# Patient Record
Sex: FEMALE | Race: WHITE | NOT HISPANIC OR LATINO | Employment: OTHER | URBAN - METROPOLITAN AREA
[De-identification: names, ages, dates, MRNs, and addresses within clinical notes are randomized per-mention and may not be internally consistent; named-entity substitution may affect disease eponyms.]

---

## 2017-07-17 ENCOUNTER — CONVERSION ENCOUNTER (OUTPATIENT)
Dept: OTHER | Facility: HOSPITAL | Age: 72
End: 2017-07-17

## 2018-02-14 ENCOUNTER — OFFICE VISIT CONVERTED (OUTPATIENT)
Dept: FAMILY MEDICINE CLINIC | Age: 73
End: 2018-02-14
Attending: FAMILY MEDICINE

## 2018-03-28 ENCOUNTER — OFFICE VISIT CONVERTED (OUTPATIENT)
Dept: FAMILY MEDICINE CLINIC | Age: 73
End: 2018-03-28
Attending: FAMILY MEDICINE

## 2018-07-10 ENCOUNTER — OFFICE VISIT CONVERTED (OUTPATIENT)
Dept: FAMILY MEDICINE CLINIC | Age: 73
End: 2018-07-10
Attending: FAMILY MEDICINE

## 2018-07-18 ENCOUNTER — OFFICE VISIT CONVERTED (OUTPATIENT)
Dept: FAMILY MEDICINE CLINIC | Age: 73
End: 2018-07-18
Attending: FAMILY MEDICINE

## 2018-08-15 ENCOUNTER — CONVERSION ENCOUNTER (OUTPATIENT)
Dept: OTHER | Facility: HOSPITAL | Age: 73
End: 2018-08-15

## 2018-08-17 ENCOUNTER — CONVERSION ENCOUNTER (OUTPATIENT)
Dept: OTHER | Facility: HOSPITAL | Age: 73
End: 2018-08-17

## 2018-08-17 ENCOUNTER — OFFICE VISIT CONVERTED (OUTPATIENT)
Dept: CARDIOLOGY | Facility: CLINIC | Age: 73
End: 2018-08-17
Attending: INTERNAL MEDICINE

## 2018-08-29 ENCOUNTER — CONVERSION ENCOUNTER (OUTPATIENT)
Dept: CARDIOLOGY | Facility: CLINIC | Age: 73
End: 2018-08-29
Attending: INTERNAL MEDICINE

## 2018-10-18 ENCOUNTER — OFFICE VISIT CONVERTED (OUTPATIENT)
Dept: FAMILY MEDICINE CLINIC | Age: 73
End: 2018-10-18
Attending: FAMILY MEDICINE

## 2019-01-07 ENCOUNTER — OFFICE VISIT CONVERTED (OUTPATIENT)
Dept: FAMILY MEDICINE CLINIC | Age: 74
End: 2019-01-07
Attending: FAMILY MEDICINE

## 2019-01-22 ENCOUNTER — HOSPITAL ENCOUNTER (OUTPATIENT)
Dept: OTHER | Facility: HOSPITAL | Age: 74
Discharge: HOME | End: 2019-01-22
Attending: FAMILY MEDICINE

## 2019-01-22 ENCOUNTER — OFFICE VISIT CONVERTED (OUTPATIENT)
Dept: FAMILY MEDICINE CLINIC | Age: 74
End: 2019-01-22
Attending: FAMILY MEDICINE

## 2019-01-22 LAB
ALBUMIN SERPL-MCNC: 4.2 G/DL (ref 3.5–5)
ALBUMIN/GLOB SERPL: 1.4 {RATIO} (ref 1.4–2.6)
ALP SERPL-CCNC: 40 U/L (ref 43–160)
ALT SERPL-CCNC: 47 U/L (ref 10–40)
ANION GAP SERPL CALC-SCNC: 20 MMOL/L (ref 8–19)
AST SERPL-CCNC: 49 U/L (ref 15–50)
BILIRUB SERPL-MCNC: 0.37 MG/DL (ref 0.2–1.3)
BUN SERPL-MCNC: 19 MG/DL (ref 5–25)
BUN/CREAT SERPL: 18 {RATIO} (ref 6–20)
CALCIUM SERPL-MCNC: 9.8 MG/DL (ref 8.7–10.4)
CHLORIDE SERPL-SCNC: 100 MMOL/L (ref 99–111)
CHOLEST SERPL-MCNC: 189 MG/DL (ref 107–200)
CHOLEST/HDLC SERPL: 4 {RATIO} (ref 3–6)
CONV CO2: 24 MMOL/L (ref 22–32)
CONV TOTAL PROTEIN: 7.2 G/DL (ref 6.3–8.2)
CREAT UR-MCNC: 1.03 MG/DL (ref 0.5–0.9)
EST. AVERAGE GLUCOSE BLD GHB EST-MCNC: 212 MG/DL
GFR SERPLBLD BASED ON 1.73 SQ M-ARVRAT: 54 ML/MIN/{1.73_M2}
GLOBULIN UR ELPH-MCNC: 3 G/DL (ref 2–3.5)
GLUCOSE SERPL-MCNC: 215 MG/DL (ref 65–99)
HBA1C MFR BLD: 9 % (ref 3.5–5.7)
HDLC SERPL-MCNC: 47 MG/DL (ref 40–60)
LDLC SERPL CALC-MCNC: 110 MG/DL (ref 70–100)
OSMOLALITY SERPL CALC.SUM OF ELEC: 297 MOSM/KG (ref 273–304)
POTASSIUM SERPL-SCNC: 4.5 MMOL/L (ref 3.5–5.3)
SODIUM SERPL-SCNC: 139 MMOL/L (ref 135–147)
TRIGL SERPL-MCNC: 160 MG/DL (ref 40–150)
VLDLC SERPL-MCNC: 32 MG/DL (ref 5–37)

## 2019-02-04 ENCOUNTER — OFFICE VISIT CONVERTED (OUTPATIENT)
Dept: FAMILY MEDICINE CLINIC | Age: 74
End: 2019-02-04
Attending: NURSE PRACTITIONER

## 2019-03-05 ENCOUNTER — OFFICE VISIT CONVERTED (OUTPATIENT)
Dept: FAMILY MEDICINE CLINIC | Age: 74
End: 2019-03-05
Attending: FAMILY MEDICINE

## 2019-03-13 ENCOUNTER — OFFICE VISIT CONVERTED (OUTPATIENT)
Dept: FAMILY MEDICINE CLINIC | Age: 74
End: 2019-03-13
Attending: FAMILY MEDICINE

## 2019-03-13 ENCOUNTER — HOSPITAL ENCOUNTER (OUTPATIENT)
Dept: OTHER | Facility: HOSPITAL | Age: 74
Discharge: HOME | End: 2019-03-13
Attending: FAMILY MEDICINE

## 2019-03-13 ENCOUNTER — OFFICE VISIT CONVERTED (OUTPATIENT)
Dept: OTOLARYNGOLOGY | Facility: CLINIC | Age: 74
End: 2019-03-13
Attending: OTOLARYNGOLOGY

## 2019-03-13 ENCOUNTER — CONVERSION ENCOUNTER (OUTPATIENT)
Dept: OTOLARYNGOLOGY | Facility: CLINIC | Age: 74
End: 2019-03-13

## 2019-04-04 ENCOUNTER — HOSPITAL ENCOUNTER (OUTPATIENT)
Dept: PHYSICAL THERAPY | Facility: CLINIC | Age: 74
Setting detail: RECURRING SERIES
Discharge: HOME | End: 2019-05-02
Attending: OTOLARYNGOLOGY

## 2019-04-22 ENCOUNTER — OFFICE VISIT CONVERTED (OUTPATIENT)
Dept: FAMILY MEDICINE CLINIC | Age: 74
End: 2019-04-22
Attending: FAMILY MEDICINE

## 2019-07-31 ENCOUNTER — HOSPITAL ENCOUNTER (OUTPATIENT)
Dept: OTHER | Facility: HOSPITAL | Age: 74
Discharge: HOME | End: 2019-07-31
Attending: FAMILY MEDICINE

## 2019-07-31 ENCOUNTER — OFFICE VISIT CONVERTED (OUTPATIENT)
Dept: FAMILY MEDICINE CLINIC | Age: 74
End: 2019-07-31
Attending: FAMILY MEDICINE

## 2019-07-31 LAB
ALBUMIN SERPL-MCNC: 4.2 G/DL (ref 3.5–5)
ALBUMIN/GLOB SERPL: 1.8 {RATIO} (ref 1.4–2.6)
ALP SERPL-CCNC: 48 U/L (ref 43–160)
ALT SERPL-CCNC: 38 U/L (ref 10–40)
ANION GAP SERPL CALC-SCNC: 15 MMOL/L (ref 8–19)
AST SERPL-CCNC: 26 U/L (ref 15–50)
BILIRUB SERPL-MCNC: 0.5 MG/DL (ref 0.2–1.3)
BUN SERPL-MCNC: 13 MG/DL (ref 5–25)
BUN/CREAT SERPL: 15 {RATIO} (ref 6–20)
CALCIUM SERPL-MCNC: 10.2 MG/DL (ref 8.7–10.4)
CHLORIDE SERPL-SCNC: 102 MMOL/L (ref 99–111)
CHOLEST SERPL-MCNC: 258 MG/DL (ref 107–200)
CHOLEST/HDLC SERPL: 5.4 {RATIO} (ref 3–6)
CONV CO2: 26 MMOL/L (ref 22–32)
CONV CREATININE URINE, RANDOM: 255.7 MG/DL (ref 10–300)
CONV MICROALBUM.,U,RANDOM: 49.4 MG/L (ref 0–20)
CONV TOTAL PROTEIN: 6.6 G/DL (ref 6.3–8.2)
CREAT UR-MCNC: 0.86 MG/DL (ref 0.5–0.9)
EST. AVERAGE GLUCOSE BLD GHB EST-MCNC: 255 MG/DL
GFR SERPLBLD BASED ON 1.73 SQ M-ARVRAT: >60 ML/MIN/{1.73_M2}
GLOBULIN UR ELPH-MCNC: 2.4 G/DL (ref 2–3.5)
GLUCOSE SERPL-MCNC: 192 MG/DL (ref 65–99)
HBA1C MFR BLD: 10.5 % (ref 3.5–5.7)
HDLC SERPL-MCNC: 48 MG/DL (ref 40–60)
LDLC SERPL CALC-MCNC: 186 MG/DL (ref 70–100)
MICROALBUMIN/CREAT UR: 19.3 MG/G{CRE} (ref 0–35)
OSMOLALITY SERPL CALC.SUM OF ELEC: 293 MOSM/KG (ref 273–304)
POTASSIUM SERPL-SCNC: 4.4 MMOL/L (ref 3.5–5.3)
SODIUM SERPL-SCNC: 139 MMOL/L (ref 135–147)
TRIGL SERPL-MCNC: 122 MG/DL (ref 40–150)
VIT B12 SERPL-MCNC: 372 PG/ML (ref 211–911)
VLDLC SERPL-MCNC: 24 MG/DL (ref 5–37)

## 2019-08-22 ENCOUNTER — HOSPITAL ENCOUNTER (OUTPATIENT)
Dept: OTHER | Facility: HOSPITAL | Age: 74
Discharge: HOME | End: 2019-08-22
Attending: FAMILY MEDICINE

## 2019-10-23 ENCOUNTER — OFFICE VISIT CONVERTED (OUTPATIENT)
Dept: FAMILY MEDICINE CLINIC | Age: 74
End: 2019-10-23
Attending: FAMILY MEDICINE

## 2019-10-31 ENCOUNTER — OFFICE VISIT CONVERTED (OUTPATIENT)
Dept: FAMILY MEDICINE CLINIC | Age: 74
End: 2019-10-31
Attending: FAMILY MEDICINE

## 2019-11-18 ENCOUNTER — OFFICE VISIT CONVERTED (OUTPATIENT)
Dept: DIABETES SERVICES | Facility: HOSPITAL | Age: 74
End: 2019-11-18
Attending: NURSE PRACTITIONER

## 2019-12-16 ENCOUNTER — OFFICE VISIT CONVERTED (OUTPATIENT)
Dept: DIABETES SERVICES | Facility: HOSPITAL | Age: 74
End: 2019-12-16
Attending: NURSE PRACTITIONER

## 2020-01-23 ENCOUNTER — OFFICE VISIT CONVERTED (OUTPATIENT)
Dept: FAMILY MEDICINE CLINIC | Age: 75
End: 2020-01-23
Attending: FAMILY MEDICINE

## 2020-01-23 ENCOUNTER — HOSPITAL ENCOUNTER (OUTPATIENT)
Dept: OTHER | Facility: HOSPITAL | Age: 75
Discharge: HOME | End: 2020-01-23
Attending: FAMILY MEDICINE

## 2020-01-23 LAB
ALBUMIN SERPL-MCNC: 4 G/DL (ref 3.5–5)
ALBUMIN/GLOB SERPL: 1.5 {RATIO} (ref 1.4–2.6)
ALP SERPL-CCNC: 71 U/L (ref 43–160)
ALT SERPL-CCNC: 43 U/L (ref 10–40)
ANION GAP SERPL CALC-SCNC: 17 MMOL/L (ref 8–19)
AST SERPL-CCNC: 41 U/L (ref 15–50)
BILIRUB SERPL-MCNC: 0.37 MG/DL (ref 0.2–1.3)
BUN SERPL-MCNC: 23 MG/DL (ref 5–25)
BUN/CREAT SERPL: 25 {RATIO} (ref 6–20)
CALCIUM SERPL-MCNC: 9.9 MG/DL (ref 8.7–10.4)
CHLORIDE SERPL-SCNC: 101 MMOL/L (ref 99–111)
CHOLEST SERPL-MCNC: 162 MG/DL (ref 107–200)
CHOLEST/HDLC SERPL: 3.8 {RATIO} (ref 3–6)
CONV CO2: 24 MMOL/L (ref 22–32)
CONV TOTAL PROTEIN: 6.7 G/DL (ref 6.3–8.2)
CREAT UR-MCNC: 0.92 MG/DL (ref 0.5–0.9)
EST. AVERAGE GLUCOSE BLD GHB EST-MCNC: 263 MG/DL
GFR SERPLBLD BASED ON 1.73 SQ M-ARVRAT: >60 ML/MIN/{1.73_M2}
GLOBULIN UR ELPH-MCNC: 2.7 G/DL (ref 2–3.5)
GLUCOSE SERPL-MCNC: 237 MG/DL (ref 65–99)
HBA1C MFR BLD: 10.8 % (ref 3.5–5.7)
HDLC SERPL-MCNC: 43 MG/DL (ref 40–60)
LDLC SERPL CALC-MCNC: 98 MG/DL (ref 70–100)
OSMOLALITY SERPL CALC.SUM OF ELEC: 295 MOSM/KG (ref 273–304)
POTASSIUM SERPL-SCNC: 4.6 MMOL/L (ref 3.5–5.3)
SODIUM SERPL-SCNC: 137 MMOL/L (ref 135–147)
TRIGL SERPL-MCNC: 104 MG/DL (ref 40–150)
VLDLC SERPL-MCNC: 21 MG/DL (ref 5–37)

## 2020-02-13 ENCOUNTER — OFFICE VISIT CONVERTED (OUTPATIENT)
Dept: FAMILY MEDICINE CLINIC | Age: 75
End: 2020-02-13
Attending: NURSE PRACTITIONER

## 2020-02-14 ENCOUNTER — OFFICE VISIT CONVERTED (OUTPATIENT)
Dept: FAMILY MEDICINE CLINIC | Age: 75
End: 2020-02-14
Attending: FAMILY MEDICINE

## 2020-02-14 ENCOUNTER — HOSPITAL ENCOUNTER (OUTPATIENT)
Dept: OTHER | Facility: HOSPITAL | Age: 75
Discharge: HOME | End: 2020-02-14
Attending: NURSE PRACTITIONER

## 2020-02-19 ENCOUNTER — OFFICE VISIT CONVERTED (OUTPATIENT)
Dept: FAMILY MEDICINE CLINIC | Age: 75
End: 2020-02-19
Attending: FAMILY MEDICINE

## 2020-04-17 ENCOUNTER — OFFICE VISIT CONVERTED (OUTPATIENT)
Dept: FAMILY MEDICINE CLINIC | Age: 75
End: 2020-04-17
Attending: FAMILY MEDICINE

## 2020-04-22 ENCOUNTER — OFFICE VISIT CONVERTED (OUTPATIENT)
Dept: FAMILY MEDICINE CLINIC | Age: 75
End: 2020-04-22
Attending: FAMILY MEDICINE

## 2020-06-04 ENCOUNTER — OFFICE VISIT CONVERTED (OUTPATIENT)
Dept: DIABETES SERVICES | Facility: HOSPITAL | Age: 75
End: 2020-06-04
Attending: NURSE PRACTITIONER

## 2020-06-04 ENCOUNTER — HOSPITAL ENCOUNTER (OUTPATIENT)
Dept: DIABETES SERVICES | Facility: HOSPITAL | Age: 75
Discharge: HOME | End: 2020-06-04
Attending: NURSE PRACTITIONER

## 2020-06-16 ENCOUNTER — OFFICE VISIT CONVERTED (OUTPATIENT)
Dept: FAMILY MEDICINE CLINIC | Age: 75
End: 2020-06-16
Attending: FAMILY MEDICINE

## 2020-08-05 ENCOUNTER — OFFICE VISIT CONVERTED (OUTPATIENT)
Dept: FAMILY MEDICINE CLINIC | Age: 75
End: 2020-08-05
Attending: FAMILY MEDICINE

## 2020-08-05 ENCOUNTER — HOSPITAL ENCOUNTER (OUTPATIENT)
Dept: OTHER | Facility: HOSPITAL | Age: 75
Discharge: HOME | End: 2020-08-05
Attending: FAMILY MEDICINE

## 2020-08-05 LAB
BASOPHILS # BLD MANUAL: 0.02 10*3/UL (ref 0–0.2)
BASOPHILS NFR BLD MANUAL: 0.2 % (ref 0–3)
DEPRECATED RDW RBC AUTO: 44.5 FL
EOSINOPHIL # BLD MANUAL: 0.17 10*3/UL (ref 0–0.7)
EOSINOPHIL NFR BLD MANUAL: 2.1 % (ref 0–7)
ERYTHROCYTE [DISTWIDTH] IN BLOOD BY AUTOMATED COUNT: 13.9 % (ref 11.5–14.5)
GRANS (ABSOLUTE): 4.99 10*3/UL (ref 2–8)
GRANS: 61.5 % (ref 30–85)
HBA1C MFR BLD: 14.7 G/DL (ref 12–16)
HCT VFR BLD AUTO: 44.2 % (ref 37–47)
IMM GRANULOCYTES # BLD: 0.02 10*3/UL (ref 0–0.54)
IMM GRANULOCYTES NFR BLD: 0.2 % (ref 0–0.43)
LYMPHOCYTES # BLD MANUAL: 2.36 10*3/UL (ref 1–5)
LYMPHOCYTES NFR BLD MANUAL: 6.9 % (ref 3–10)
MCH RBC QN AUTO: 28.8 PG (ref 27–31)
MCHC RBC AUTO-ENTMCNC: 33.3 G/DL (ref 33–37)
MCV RBC AUTO: 86.5 FL (ref 81–99)
MONOCYTES # BLD AUTO: 0.56 10*3/UL (ref 0.2–1.2)
PLATELET # BLD AUTO: 219 10*3/UL (ref 130–400)
PMV BLD AUTO: 10 FL (ref 7.4–10.4)
RBC # BLD AUTO: 5.11 10*6/UL (ref 4.2–5.4)
VARIANT LYMPHS NFR BLD MANUAL: 29.1 % (ref 20–45)
WBC # BLD AUTO: 8.12 10*3/UL (ref 4.8–10.8)

## 2020-08-06 LAB
ALBUMIN SERPL-MCNC: 4.3 G/DL (ref 3.5–5)
ALBUMIN/GLOB SERPL: 1.3 {RATIO} (ref 1.4–2.6)
ALP SERPL-CCNC: 39 U/L (ref 43–160)
ALT SERPL-CCNC: 46 U/L (ref 10–40)
ANION GAP SERPL CALC-SCNC: 19 MMOL/L (ref 8–19)
AST SERPL-CCNC: 43 U/L (ref 15–50)
BILIRUB SERPL-MCNC: 0.59 MG/DL (ref 0.2–1.3)
BUN SERPL-MCNC: 15 MG/DL (ref 5–25)
BUN/CREAT SERPL: 14 {RATIO} (ref 6–20)
CALCIUM SERPL-MCNC: 10.7 MG/DL (ref 8.7–10.4)
CHLORIDE SERPL-SCNC: 100 MMOL/L (ref 99–111)
CHOLEST SERPL-MCNC: 324 MG/DL (ref 107–200)
CHOLEST/HDLC SERPL: 5.8 {RATIO} (ref 3–6)
CONV CO2: 24 MMOL/L (ref 22–32)
CONV CREATININE URINE, RANDOM: 205.3 MG/DL (ref 10–300)
CONV MICROALBUM.,U,RANDOM: 68.2 MG/L (ref 0–20)
CONV TOTAL PROTEIN: 7.5 G/DL (ref 6.3–8.2)
CREAT UR-MCNC: 1.08 MG/DL (ref 0.5–0.9)
EST. AVERAGE GLUCOSE BLD GHB EST-MCNC: 237 MG/DL
FOLATE SERPL-MCNC: 10.3 NG/ML (ref 4.8–20)
GFR SERPLBLD BASED ON 1.73 SQ M-ARVRAT: 50 ML/MIN/{1.73_M2}
GLOBULIN UR ELPH-MCNC: 3.2 G/DL (ref 2–3.5)
GLUCOSE SERPL-MCNC: 189 MG/DL (ref 65–99)
HBA1C MFR BLD: 9.9 % (ref 3.5–5.7)
HDLC SERPL-MCNC: 56 MG/DL (ref 40–60)
LDLC SERPL CALC-MCNC: 248 MG/DL (ref 70–100)
MICROALBUMIN/CREAT UR: 33.2 MG/G{CRE} (ref 0–35)
OSMOLALITY SERPL CALC.SUM OF ELEC: 292 MOSM/KG (ref 273–304)
POTASSIUM SERPL-SCNC: 4.7 MMOL/L (ref 3.5–5.3)
SODIUM SERPL-SCNC: 138 MMOL/L (ref 135–147)
TRIGL SERPL-MCNC: 98 MG/DL (ref 40–150)
TSH SERPL-ACNC: 1.28 M[IU]/L (ref 0.27–4.2)
VIT B12 SERPL-MCNC: 303 PG/ML (ref 211–911)
VLDLC SERPL-MCNC: 20 MG/DL (ref 5–37)

## 2020-08-31 ENCOUNTER — HOSPITAL ENCOUNTER (OUTPATIENT)
Dept: OTHER | Facility: HOSPITAL | Age: 75
Discharge: HOME | End: 2020-08-31
Attending: FAMILY MEDICINE

## 2020-09-02 ENCOUNTER — OFFICE VISIT CONVERTED (OUTPATIENT)
Dept: FAMILY MEDICINE CLINIC | Age: 75
End: 2020-09-02
Attending: NURSE PRACTITIONER

## 2020-09-04 ENCOUNTER — HOSPITAL ENCOUNTER (OUTPATIENT)
Dept: DIABETES SERVICES | Facility: HOSPITAL | Age: 75
Discharge: HOME | End: 2020-09-04
Attending: NURSE PRACTITIONER

## 2020-09-04 ENCOUNTER — OFFICE VISIT CONVERTED (OUTPATIENT)
Dept: DIABETES SERVICES | Facility: HOSPITAL | Age: 75
End: 2020-09-04
Attending: NURSE PRACTITIONER

## 2020-09-15 ENCOUNTER — OFFICE VISIT CONVERTED (OUTPATIENT)
Dept: OTOLARYNGOLOGY | Facility: CLINIC | Age: 75
End: 2020-09-15
Attending: OTOLARYNGOLOGY

## 2020-09-30 ENCOUNTER — OFFICE VISIT CONVERTED (OUTPATIENT)
Dept: NEUROLOGY | Facility: CLINIC | Age: 75
End: 2020-09-30
Attending: FAMILY MEDICINE

## 2020-11-03 ENCOUNTER — OFFICE VISIT CONVERTED (OUTPATIENT)
Dept: PSYCHIATRY | Facility: CLINIC | Age: 75
End: 2020-11-03
Attending: STUDENT IN AN ORGANIZED HEALTH CARE EDUCATION/TRAINING PROGRAM

## 2021-01-05 ENCOUNTER — TELEMEDICINE CONVERTED (OUTPATIENT)
Dept: PSYCHIATRY | Facility: CLINIC | Age: 76
End: 2021-01-05
Attending: STUDENT IN AN ORGANIZED HEALTH CARE EDUCATION/TRAINING PROGRAM

## 2021-01-19 ENCOUNTER — TELEMEDICINE CONVERTED (OUTPATIENT)
Dept: PSYCHIATRY | Facility: CLINIC | Age: 76
End: 2021-01-19
Attending: STUDENT IN AN ORGANIZED HEALTH CARE EDUCATION/TRAINING PROGRAM

## 2021-03-02 ENCOUNTER — TELEMEDICINE CONVERTED (OUTPATIENT)
Dept: PSYCHIATRY | Facility: CLINIC | Age: 76
End: 2021-03-02
Attending: STUDENT IN AN ORGANIZED HEALTH CARE EDUCATION/TRAINING PROGRAM

## 2021-05-10 NOTE — H&P
"   History and Physical      Patient Name: Denisse Malave   Patient ID: [id number]   Sex: Female   YOB: 1945    Primary Care Provider: Geeta Patterson MD   Referring Provider: Geeta Patterson MD    Visit Date: November 3, 2020    Provider: Edy Li MD   Location: [facility]   Location Address: [address] [place] [address]  [id number]   Location Phone: [phone]          Chief Complaint     PT presents withy anxiety, depression, and dementia      \"I just need help.\"       History Of Present Illness  Denisse Malave is a 75 year old /White female who presents to the office today referred by Geeta Patterson MD.   Chart: Seen 9/30 by Bailey. Falls (2-3x/mo) and gait abnormalities.  passed in 2013. Shuffling gait; uses rollator. MRI: frontotemporal atrophy and ventriculomegaly. Diffuse white matter changes. Incontinent. Likely vascular dementia. On venlafaxine XR 75 mg TID. Outside records (from Sharon Stinson and Geeta Patterson, scanned in 11/3, [facility]) show patient was \"very depressed and cries a lot\" at her assisted living facility. PHQ-9 is 18. CKD stage 3, H/o stroke, HLD, HTN, T2DM, TBI?.   Patient presents with her son Francisco Javier. Patient reports that she was moved to assisted living about 1 month ago from the house that she had lived in for decades. Initially she hated it and cried every day, not wanting to be there. Patient would call her family and be verbally harsh with them every day telling them that they did not care about her because they would not make arrangements for her to leave. At that time, the appointment with our office was made. However, nowadays, she is getting used to it, and \"it is getting better.\" Patient reports \"they take good care of me there.\" Francisco Javier agrees, stating patient is much better. Patient recently started quetiapine 25 mg at 3 PM daily, which the patient believes helps with anxiety and is helping her sleep.   No " "acute concerns. No SI HI AVH. PHQ 9 score today is 5, none to mild. Krishna 7 score is 20 today, severe, however the patient denies anxiety.   Patient does report having visual hallucinations of her  fishing on a pond with her neighbor about 6 months ago when she would look out of the window of her house. It is unclear how that hallucination occurred. The patient denies any hallucinations since that time, although she does note that sometimes she dreams about her , who was \"a good man.\"   Patient is medication compliant on quetiapine 25 mg p.o. q. 3:00 daily, and venlafaxine 75 mg 3 times daily. Patient and son report that they tried to cut down on the venlafaxine to only 2 doses a day many months ago, and the patient became very irritable, prompting them to increase the dose back to 3 times a day. The patient made this temporary change without notifying her primary care physician.   Past Psychiatric History:  Began Psychiatric Treatment: Denies psychiatric care in the past. Patient was treated by her primary care physician, who diagnosed her with depression. Denies admissions. Denies self-harm or suicide attempts. Patient does note that she might have been tried on other medications besides venlafaxine, but this was over 3 decades ago and she cannot remember the names. Denies any experience with psychotherapy.   Substance Abuse History:  Types: Denies all, including illicit   Social History:  Marital Status:    Employed: No     Kids: 2 sons, 1 .   House: Now lives in an assisted living facility.    Hx: Denies   Family History:  Suicide Attempts: Denies   Suicide Completions: Denies   Substance Use: Her brother once was a heavy drinker   Psychiatric Conditions: Denies    depression, psychosis, anxiety: Deferred   Developmental History:  Born: [place]   Siblings: Deferred   Childhood: no abuse and \"Good and poor\" childhood   High School: Patient has her GED   College: " Denies   Access to Weapons: Denies   Thought Content: no suicidal ideation, no homicidal ideation, no auditory hallucinations, and no visual hallucinations       Past Medical History  Anxiety; Diabetes; Dizziness; Hearing loss; HTN (hypertension); Hyperlipemia; Vertigo         Past Surgical History  EAR Surgery; Gallbladder; Hysterectomy; Shoulder surgery         Medication List  acetaminophen 500 mg oral tablet; Basaglar KwikPen U-100 Insulin 100 unit/mL (3 mL) subcutaneous insulin pen; ciprofloxacin HCl 0.3 % ophthalmic (eye) drops; losartan 100 mg oral tablet; metformin 1,000 mg oral tablet; metoprolol succinate 50 mg oral tablet extended release 24 hr; venlafaxine 75 mg oral tablet         Allergy List  NO KNOWN DRUG ALLERGIES         Family Medical History  Family history of heart disease; Family history of diabetes mellitus; Family history of hypertension         Social History  Alcohol; Tobacco (Never)         Review of Systems  · Constitutional  o Denies  o : fatigue, night sweats  · Eyes  o Admits  o : double vision, blurred vision  · HENT  o Admits  o : vertigo  o Denies  o : recent head injury  · Cardiovascular  o Denies  o : chest pain, irregular heart beats  · Respiratory  o Denies  o : shortness of breath, productive cough  · Gastrointestinal  o Denies  o : nausea, vomiting  · Genitourinary  o Denies  o : dysuria, urinary retention  · Integument  o Denies  o : hair growth change, new skin lesions  · Neurologic  o Denies  o : altered mental status, seizures  · Musculoskeletal  o Admits  o : limitation of motion  o Denies  o : joint swelling  · Endocrine  o Denies  o : cold intolerance, heat intolerance  · Psychiatric  o Admits  o : anxiety, depression  o Denies  o : post traumatic stress disorder, obsessive compulsive disorder, psychosis, ed  · Allergic-Immunologic  o Denies  o : frequent illnesses      Vitals  Date Time BP Position Site L\R Cuff Size HR RR TEMP (F) WT  HT  BMI kg/m2 BSA m2 O2 Sat FR  "L/min FiO2 HC       11/03/2020 09:21 /65 Sitting    68 - R 20 96.1 169lbs 6oz 5'  3.5\" 29.53 1.86 97 %  21%          Physical Examination  · Mental Status Exam  o Appearance  o : good eye contact, normal street clothes, groomed, wearing a mask under her mouth. Uses a Rollator.  o Behavior  o : pleasant and cooperative  o Motor  o : no abnormal  o Speech  o : normal rhythm, rate, volume, tone, not hyperverbal, not pressured, normal prosidy  o Mood  o : \"Getting better\"  o Affect  o : euthymic  o Thought Content  o : negative suicidal ideations, negative homicidal ideations, negative obsessions  o Perceptions  o : negative auditory hallucinations, negative visual hallucinations  o Thought Process  o : linear, some circumstantiality  o Insight/Judgement  o : fair/fair  o Cognition  o : grossly intact  o Attention  o : intact, possibly hard of hearing          Assessment  · Screening for depression     V79.0/Z13.31  · Anxiety     300.02/F41.1  · Depression, unspecified depression type     311/F32.9  · Dementia     294.8/F03.90       Presentation most consistent with adjustment disorder related to moving from a home to an assisted living facility.  Patient is now adjusting better.  Also vascular dementia, with likely sundowning. Duval score is 4 today.  Patient has a history of depression controlled on venlafaxine 75 mg p.o. 3 times daily.  She is now sleeping better on quetiapine in the evenings. Start melatonin over the counter to target sundowning (impaired circadian regulation).    Continue medications.  I counseled the patient and her son that there is a theoretical risk of a higher mortality rate in patients placed on antipsychotics with dementia.  I emphasized that the patient is on a very low-dose of an antipsychotic. They voiced understanding, and agreed to proceed.    Ordered an EKG, CBC, TSH, BMP, B12 and folate today.    Patient is comfortable with her primary care physician seeing these notes.    See " back in 2 months.       Plan  · Orders  o ACO-18: Positive screen for clinical depression using a standardized tool and a follow-up plan documented () - V79.0/Z13.31 - 11/03/2020  o B12 Folate levels (B12FO) - 294.8/F03.90, V79.0/Z13.31 - 11/03/2020  o BMP [facility] (35618) - 294.8/F03.90, V79.0/Z13.31 - 11/03/2020  o CBC with Auto Diff [facility] (76155) - 294.8/F03.90, V79.0/Z13.31 - 11/03/2020  o TSH [facility] (57722) - 294.8/F03.90, V79.0/Z13.31 - 11/03/2020  o Free (FT4) thyroxine assay (27794) - 294.8/F03.90, V79.0/Z13.31 - 11/03/2020  o ACO-39: Current medications updated and reviewed (, 1159F) - - 11/03/2020  o EKG with at least 12 leads, INTERPRETATION AND REPORT ONLY [facility] (07689) - 294.8/F03.90, V79.0/Z13.31 - 11/03/2020  · Medications  o Medications have been Reconciled  o Transition of Care or Provider Policy  · Instructions  o Risk Assessment: Risk of self-harm acutely is low. Risk factors include significant psychosocial stressor involved in moving to an assisted living facility, possible depressive symptoms, possible anxiety symptoms. Protective factors include no access to weapons, no present SI, no history of self-harm or suicide attempts, no family history, no AODA, healthcare seeking, future oriented, willingness to engage in care. Risk of self-harm chronically is also low, but could be further elevated in the event of treatment noncompliance and/or AODA.  o Safety: No acute safety concerns.  o Medications: Continue venlafaxine 75 mg p.o. 3 times daily. START low dose melatonin in the evenings at 5 pm to target sundowning. Continue quetiapine 25 mg p.o. every 3 p.m. Risks, benefits discussed with patient including sedation, dizziness, GI upset, nausea and vomiting. Also discussed theoretical risk of increased mortality on an antipsychotic. After discussion of these risks and benefits, the patient and her son voiced understanding and agreed to proceed.  o Therapy: Possibly in the future.  o Labs/Studies:  CBC, BMP, TSH, free T4, B12, and folate. Also an EKG to assess QTC.  o Follow Up: 2 months.  · Disposition  o Follow Up in 2 months.  · Correspondence  o Behavioral Health Letter to Referring MD (Geeta Patterson MD) - 11/03/2020            Electronically Signed by: Edy Li MD -Author on November 3, 2020 01:14:56 PM

## 2021-05-10 NOTE — H&P
History and Physical      Patient Name: Denisse Malave   Patient ID: [id number]   Sex: Female   YOB: 1945    Primary Care Provider: Geeta Patterson MD   Referring Provider: Geeta Patterson MD    Visit Date: 2020    Provider: Clyde Avalos MD   Location: [facility]   Location Address: [address] [place] [address]  [id number]   Location Phone: [phone]          Chief Complaint     New pt here for recurrent falls/ possible normal pressure hydrocephalus.       History Of Present Illness  Denisse Malave is a 75 year old /White female who presents today to [facility] today referred from Geeta Patterson MD.      75-year-old woman evaluated for gait abnormalities and falls.  She states that she has been falling for the last 10 years.  She le783 times prior to 2013 when her   at that time.  Since that time she follows usually anywhere from 2-3 times a month.  She gets to stumble on a daily basis.  She lives by herself.  Her son is with her today.  She states that she can fall backwards as well.  She does not  her feet.  She shuffles her feet and walks with a wide-based gait.  She does not use her four-point cane all the time.  She has a Rollator walker at home that she does not use.  There is x1 according to the son the patient acts like a 4-year-old and does not cooperate.  She has had an MRI of the brain showing frontotemporal atrophy and diffuse atrophy and the ventriculomegaly is not out of proportion to the atrophy.  She has scattered white matter changes that is diffuse including the brainstem.  She states that she is wearing pull-ups recently for incontinence.  She drives on occasion.  Her son states that he does not think that she is demented.       Past Medical History  Anxiety; Diabetes; Dizziness; Hearing loss; HTN (hypertension); Hyperlipemia; Vertigo         Past Surgical History  EAR Surgery;  Gallbladder; Hysterectomy; Shoulder surgery         Medication List  acetaminophen 500 mg oral tablet; Basaglar KwikPen U-100 Insulin 100 unit/mL (3 mL) subcutaneous insulin pen; ciprofloxacin HCl 0.3 % ophthalmic (eye) drops; losartan 100 mg oral tablet; metformin 1,000 mg oral tablet; metoprolol succinate 50 mg oral tablet extended release 24 hr; venlafaxine 75 mg oral tablet         Allergy List  NO KNOWN DRUG ALLERGIES         Family Medical History  Family history of heart disease; Family history of diabetes mellitus; Family history of hypertension         Social History  Alcohol; Tobacco (Never)         Review of Systems  · Constitutional  o Denies  o : chills, excessive sweating, fatigue, fever, sycope/passing out, weight gain, weight loss  · Eyes  o Denies  o : changes in vision, blurry vision, double vision  · HENT  o Denies  o : loss of hearing, ringing in the ears, ear aches, sore throat, nasal congestion, sinus pain, nose bleeds, seasonal allergies  · Cardiovascular  o Denies  o : blood clots, swollen legs, anemia, easy burising or bleeding, transfusions  · Respiratory  o Denies  o : shortness of breath, dry cough, productive cough, pneumonia, COPD  · Gastrointestinal  o Denies  o : difficulty swallowing, reflux  · Genitourinary  o Denies  o : incontinence  · Neurologic  o Denies  o : headache, seizure, stroke, tremor, loss of balance, falls, dizziness/vertigo, difficulty with sleep, numbness/tingling/paresthesia , difficulty with coordination, difficulty with dexterity, weakness  · Musculoskeletal  o Denies  o : neck stiffness/pain, swollen lymph nodes, muscle aches, joint pain, weakness, spasms, sciatica, pain radiating in arm, pain radiating in leg, low back pain  · Endocrine  o Denies  o : diabetes, thyroid disorder  · Psychiatric  o Denies  o : anxiety, depression      Vitals  Date Time BP Position Site L\R Cuff Size HR RR TEMP (F) WT  HT  BMI kg/m2 BSA m2 O2 Sat FR L/min FiO2 HC       09/30/2020  "02:02 PM        96.4           09/30/2020 02:26 /71 Sitting    64 - R   172lbs 0oz 5'  3\" 30.47 1.86             Physical Examination     She is alert, fluent, phasic, follows commands well intermittently.  Her Mini-Mental status score is 22 out of 30.  She missed the date, 3 out of 3 recent objects, 3 out of 5 spelling world backwards.  Clock drawing is impaired on 3 attempts.  The first attempt she did not space her numbers the right way and could not draw the hands of a clock correctly.  The second attempt she did not follow my instructions and full 12:00 instead of 11:10 and she missed drawing 1:00 on the clock.  On the third attempt she put the center of the clock at 10:00 short hand to 11 and long hand to 2 for instructions of 11:10.  She got frustrated and told me she did not know what she was doing. She is able to tell me the president [place].  Optic disks are normal bilaterally, visual fields are full, EOMs full all directions gaze, facial strength is full, soft palate elevation and tongue are normal.  There is no tremor of the upper or lower extremities.  There is no weakness individual muscle testing proximally and distally.  Reflexes are absent in the biceps, triceps and patellar's and ankles.  Sensation is decreased to pinprick up to the knee.  Vibration is impaired higher than the knee.  Proprioception is absent in the toes.  Cerebellar testing is intact.  Station and gait she has a wide base gait that is slow and shuffling she picks up her four-point cane and does not use it.  She has postural instability and that she tends to lean backwards or sideways when she is sitting down table.  Her heart is regular rhythm normal in rate.           Assessment  · Mild cognitive impairment with memory loss       Mild cognitive impairment, so stated     331.83/G31.84  I discussed with the son that she has cognitive dysfunction most likely secondary to small vessel disease and may be in early stages of " dementia. She needs to be in a supervised setting.  · Diabetic polyneuropathy       Type 2 diabetes mellitus with diabetic polyneuropathy     250.60/E11.42  · Gait disturbance     781.2/R26.9  Her gait disturbance is a combination of diabetic polyneuropathy as well as vascular parkinsonism. Discussed with her and her son that I would recommend for her to use a Rollator walker all the time. I have prescribed her wheelchairs since she was requesting for it. I discussed with him that she is at increased risk for falling and injuring herself. This includes fractures as well as intracranial injury. I would recommend for her to be placed in an assisted living or with supervision to help her with her medications. She is not taking her medications correctly according to the son.  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 She has been having gait abnormalities for years.    60 minutes was spent for this high complexity encounter more than half the time was spent face-to-face with the patient for examination, counseling, planning and recommendations      Plan  · Medications  o Medications have been Reconciled  o Transition of Care or Provider Policy  · Instructions  o Encouraged to follow-up with Primary Care Provider for preventative care.            Electronically Signed by: Clyde Avalos MD -Author on September 30, 2020 03:31:38 PM

## 2021-05-13 NOTE — PROGRESS NOTES
Progress Note      Patient Name: Denisse Malave   Patient ID: [id number]   Sex: Female   YOB: 1945    Primary Care Provider: Geeta Patterson MD   Referring Provider: Geeta Patterson MD    Visit Date: September 15, 2020    Provider: Josh Christianson MD   Location: [facility]   Location Address: [address] [place] [address], [address]  [id number]   Location Phone: [phone]          Chief Complaint     1.  Hearing loss    2.  Otosclerosis    3.  Right ear drainage       History Of Present Illness  Denisse Malave is a 75 year old /White female who presents to the office today for a follow-up visit.      She presents the clinic today for evaluation of right ear drainage.  She has a long history of ear issues and has not had multiple ear surgeries.  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  She has significant hearing loss in the right ear, and uses a hearing aid for this.  Recently, she has had drainage on the right side as well as discomfort.  She has not been on any antibiotics recently, and presents for further evaluation.  She denies any dizzy spells, but has a longstanding history of issues with dizziness.  She was previously treated for benign paroxysmal positional vertigo in the early parts of 2019.  Denies any vertigo issues with this recent infection.       Past Medical History  Anxiety; Diabetes; Dizziness; Hearing loss; HTN (hypertension); Hyperlipemia; Vertigo         Past Surgical History  EAR Surgery; Gallbladder; Hysterectomy; Shoulder surgery         Medication List  amlodipine 5 mg oral tablet; Basaglar KwikPen U-100 Insulin 100 unit/mL (3 mL) subcutaneous insulin pen; Crestor 10 mg oral tablet; Effexor XR 75 mg oral capsule,extended release 24hr; losartan 100 mg oral tablet; metformin 1,000 mg oral tablet; metoprolol succinate 50 mg oral tablet extended release 24 hr; tramadol 50 mg oral tablet         Allergy List  NO KNOWN DRUG ALLERGIES  "        Family Medical History  Family history of heart disease; Family history of diabetes mellitus; Family history of hypertension         Social History  Tobacco (Never)         Review of Systems  · Constitutional  o Denies  o : fever, night sweats, weight loss  · Eyes  o Denies  o : discharge from eye, impaired vision  · HENT  o Admits  o : *See HPI  · Cardiovascular  o Denies  o : chest pain, irregular heart beats  · Respiratory  o Denies  o : shortness of breath, wheezing, coughing up blood  · Gastrointestinal  o Denies  o : heartburn, reflux, vomiting blood  · Genitourinary  o Denies  o : frequency  · Integument  o Denies  o : rash, skin dryness  · Neurologic  o Denies  o : seizures, loss of balance, loss of consciousness, dizziness  · Endocrine  o Denies  o : cold intolerance, heat intolerance  · Heme-Lymph  o Denies  o : easy bleeding, anemia      Vitals  Date Time BP Position Site L\R Cuff Size HR RR TEMP (F) WT  HT  BMI kg/m2 BSA m2 O2 Sat HC       09/15/2020 02:57 PM        97.2 164lbs 2oz 5'  3\" 29.07 1.82           Physical Examination  · Constitutional  o Appearance  o : well developed, well-nourished, alert and in no acute distress, voice clear and strong  · Head and Face  o Head  o :   § Inspection  § : no deformities or lesions  o Face  o :   § Inspection  § : No facial lesions; House-Brackmann I/VI bilaterally  § Palpation  § : No TMJ crepitus nor  muscle tenderness bilaterally  · Eyes  o Vision  o :   § Visual Fields  § : Extraocular movements are intact. No spontaneous or gaze-induced nystagmus.  o Conjunctivae  o : clear  o Sclerae  o : clear  o Pupils and Irises  o : pupils equal, round, and reactive to light.   · Ears, Nose, Mouth and Throat  o Ears  o :   § External Ears  § : appearance within normal limits, no lesions present  § Otoscopic Examination  § : Cerumen impacted, removed with suction and microscope, tympanic membrane appearance dull bilaterally with blunted anatomical " features, purulent drainage on the right side, suctioned and Ciprodex drops were infused.  § Hearing  § : intact to conversational voice both ears  o Nose  o :   § External Nose  § : appearance normal  § Intranasal Exam  § : mucosa within normal limits, vestibules normal, no intranasal lesions present, septum midline, sinuses non tender to percussion  o Oral Cavity  o :   § Oral Mucosa  § : oral mucosa normal without pallor or cyanosis  § Lips  § : lip appearance normal  § Teeth  § : normal dentition for age  § Gums  § : gums pink, non-swollen, no bleeding present  § Tongue  § : tongue appearance normal; normal mobility  § Palate  § : hard palate normal, soft palate appearance normal with symmetric mobility  o Throat  o :   § Oropharynx  § : no inflammation or lesions present, tonsils within normal limits  § Hypopharynx  § : appearance within normal limits, superior epiglottis within normal limits  § Larynx  § : appearance within normal limits, vocal cords within normal limits, no lesions present  · Neck  o Inspection/Palpation  o : normal appearance, no masses or tenderness, trachea midline; thyroid size normal, nontender, no nodules or masses present on palpation  · Respiratory  o Respiratory Effort  o : breathing unlabored  o Inspection of Chest  o : normal appearance, no retractions  · Cardiovascular  o Heart  o : regular rate and rhythm  · Lymphatic  o Neck  o : no lymphadenopathy present  o Supraclavicular Nodes  o : no lymphadenopathy present  o Preauricular Nodes  o : no lymphadenopathy present  · Skin and Subcutaneous Tissue  o General Inspection  o : Regarding face and neck - there are no rashes present, no lesions present, and no areas of discoloration  · Neurologic  o Cranial Nerves  o : cranial nerves II-XII are grossly intact bilaterally  o Gait and Station  o : normal gait, able to stand without diffculty  · Psychiatric  o Judgement and Insight  o : judgment and insight intact  o Mood and Affect  o :  mood normal, affect appropriate          Assessment  · Cerumen impaction     380.4/H61.20  · Hearing loss     389.9/H91.90  · Otitis externa     380.10/H60.90  · Myringitis     NOCODE/H73.20    Problems Reconciled  Plan  · Orders  o Cerumen Removal by Provider, Unilateral [facility] ([id number]) - 389.9/H91.90, 380.4/H61.20 - 09/15/2020  · Medications  o ciprofloxacin HCl 0.3 % ophthalmic (eye) drops   SIG: 3 drops to right ear TID for 7 days   DISP: (1) 2.5 ml drop btl with 0 refills  Prescribed on 09/15/2020     o Medications have been Reconciled  o Transition of Care or Provider Policy  · Instructions  o She presents the clinic today for evaluation of right ear drainage. She has a long history of ear issues and has not had multiple ear surgeries. 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 She has significant hearing loss in the right ear, and uses a hearing aid for this. Recently, she has had drainage on the right side as well as discomfort. She has not been on any antibiotics recently, and presents for further evaluation. She denies any dizzy spells, but has a longstanding history of issues with dizziness. She was previously treated for benign paroxysmal positional vertigo in the early parts of 2019. Denies any vertigo issues with this recent infection. On examination today she did have a cerumen impaction and purulent drainage on the right side. I was able to remove this and infused Ciprodex drops. I prescribed Floxin drops for her to use at home, and will have her use a hair dryer to keep the ear dry. I will see her back on an as-needed basis.  o Electronically Identified Patient Education Materials Provided Electronically  · Correspondence  o ENT Letter to Referring MD (Geeta Patterson MD) - 09/15/2020            Electronically Signed by: Josh Christianson MD -Author on September 15, 2020 03:21:30 PM

## 2021-05-14 VITALS
HEART RATE: 64 BPM | WEIGHT: 172 LBS | TEMPERATURE: 96.4 F | BODY MASS INDEX: 30.48 KG/M2 | SYSTOLIC BLOOD PRESSURE: 179 MMHG | HEIGHT: 63 IN | DIASTOLIC BLOOD PRESSURE: 71 MMHG

## 2021-05-14 VITALS — BODY MASS INDEX: 29.08 KG/M2 | WEIGHT: 164.12 LBS | TEMPERATURE: 97.2 F | HEIGHT: 63 IN

## 2021-05-14 VITALS
TEMPERATURE: 96.1 F | OXYGEN SATURATION: 97 % | HEART RATE: 68 BPM | DIASTOLIC BLOOD PRESSURE: 65 MMHG | RESPIRATION RATE: 20 BRPM | WEIGHT: 169.37 LBS | BODY MASS INDEX: 30.01 KG/M2 | SYSTOLIC BLOOD PRESSURE: 159 MMHG | HEIGHT: 63 IN

## 2021-05-14 NOTE — PROGRESS NOTES
"   Progress Note      Patient Name: Denisse Malave   Patient ID: [id number]   Sex: Female   YOB: 1945    Primary Care Provider: Geeta Patterson MD   Referring Provider: Geeta Patterson MD    Visit Date: January 19, 2021    Provider: Edy Li MD   Location: [facility]   Location Address: [address] [place] [address]  [address] [place] [address]  [id number]   Location Phone: [phone]          Chief Complaint     \"My granddaughter came over last night.\"       History Of Present Illness  Denisse Malave is a 75 year old /White female who presents to the office today referred by Geeta Patterson MD.   Chart: Seen 9/30 by [place]. Falls (2-3x/mo) and gait abnormalities.  passed in 2013. Shuffling gait; uses rollator. MRI: frontotemporal atrophy and ventriculomegaly. Diffuse white matter changes. Incontinent. Likely vascular dementia. On venlafaxine XR 75 mg TID. Outside records (from [facility] and Geeta Patterson, scanned in 11/3, [facility]) show patient was \"very depressed and cries a lot\" at her assisted living facility. PHQ-9 is 18. CKD stage 3, H/o stroke, HLD, HTN, T2DM, TBI?.   Virtual visit via Zoom audio and video due to the COVID-19 pandemic. Patient is accepting of and agreeable to appointment. The appointment consisted of the patient and I only. Interview: Patient presents with her son Francisco Javier. Francisco Javier did most of the talking. Patient hallucinated last night that her granddaughter came over and brought 6 people. Their presence was irritating to the patient who wanted to pack up to leave and move out. States she is sleeping okay and eating well. Her mood is also improved. Francisco Javier essentially confirms. Patient has been doing well until last night when she hallucinated that her granddaughter visited with about 6 other people. No SI HI.   Patient fell out of bed at 7 in the morning 5 days ago and hit her head on a heater causing a minor brain bleed. Patient has mobility " issues and uses a walker.   ...   ...       Past Medical History  Disease Name Date Onset Notes   Anxiety --  --    Cancer of leg --  --    Dementia --  --    Depression, unspecified depression type --  --    Diabetes --  --    Dizziness --  --    Hearing loss --  --    HTN (hypertension) --  --    Hyperlipemia --  --    Vertigo --  --          Past Surgical History  Procedure Name Date Notes   EAR Surgery --  --    Gallbladder --  --    Hysterectomy --  --    Open bariatric bypass surgery --  --    Shoulder surgery --  --    Thyroid surgery --  --          Medication List  Name Date Started Instructions   acetaminophen 500 mg oral tablet  --    amlodipine 5 mg oral tablet  take 1 tablet (5 mg) by oral route once daily   Basaglar KwikPen U-100 Insulin 100 unit/mL (3 mL) subcutaneous insulin pen  --    Crestor 10 mg oral tablet  take 1 tablet (10 mg) by oral route once daily at bedtime   ergocalciferol (vitamin D2) 1,250 mcg (50,000 unit) oral capsule  take 1 capsule by oral route every 7 days   losartan 100 mg oral tablet  take 1 tablet (100 mg) by oral route once daily   melatonin 3 mg oral capsule  take 1 capsule by oral route once a day (at bedtime)   metformin 1,000 mg oral tablet  take 1 tablet by oral route daily   metoprolol succinate 50 mg oral tablet extended release 24 hr  take 1 tablet (50 mg) by oral route once daily   Risperdal 0.5 mg oral tablet 01/05/2021 take 1 tablet by oral route BID (at 3 pm and 8 pm)   venlafaxine 75 mg oral tablet  take 1 tablet (75 mg) by oral route 3 times per day with food         Allergy List  Allergen Name Date Reaction Notes   NO KNOWN DRUG ALLERGIES --  --  --        Allergies Reconciled  Family Medical History  Disease Name Relative/Age Notes   Family history of heart disease  --    Family history of diabetes mellitus  --    Family history of hypertension Mother/  Sister/   --          Social History  Finding Status Start/Stop Quantity Notes   Alcohol --  --/-- --   "01/19/2021 - 01/05/2021 - 11/03/2020 -    Tobacco Never --/-- --  09/30/2020 - 09/30/2020 -          Review of Systems  · Constitutional  o Admits  o : fatigue  o Denies  o : night sweats  · Eyes  o Admits  o : double vision, blurred vision  · HENT  o Admits  o : vertigo, recent head injury  · Breasts  o Denies  o : abnormal changes in breast size, additional breast symptoms except as noted in the HPI  · Cardiovascular  o Denies  o : chest pain, irregular heart beats  · Respiratory  o Denies  o : shortness of breath, productive cough  · Gastrointestinal  o Denies  o : nausea, vomiting  · Genitourinary  o Denies  o : dysuria, urinary retention  · Integument  o Denies  o : hair growth change, new skin lesions  · Neurologic  o Admits  o : altered mental status  o Denies  o : seizures  · Musculoskeletal  o Admits  o : joint swelling, limitation of motion  · Endocrine  o Admits  o : cold intolerance  o Denies  o : heat intolerance  · Heme-Lymph  o Denies  o : petechiae, lymph node enlargement or tenderness  · Allergic-Immunologic  o Denies  o : frequent illnesses      Physical Examination  · Mental Status Exam  o Appearance  o : good eye contact, normal street clothes, groomed, sitting at a table  o Behavior  o : pleasant and cooperative  o Motor  o : Psychomotor retardation  o Speech  o : normal rhythm, rate, volume, tone, not hyperverbal, not pressured, normal prosidy  o Mood  o : \"Okay\"  o Affect  o : Slight constriction, but able to smile  o Thought Content  o : negative suicidal ideations, negative homicidal ideations, negative obsessions  o Perceptions  o : negative auditory hallucinations, negative visual hallucinations  o Thought Process  o : Impaired due to dementia  o Insight/Judgement  o : Impaired due to dementia  o Cognition  o : Impaired due to dementia  o Attention  o : intact, possibly hard of hearing          Assessment  · Anxiety     300.02/F41.1  · Depression, unspecified depression " type     311/F32.9  · Dementia     294.8/F03.90       Presentation most consistent with adjustment disorder related to moving from a home to an assisted living facility.  Patient is now adjusting better.  Also vascular dementia, with likely sundowning.  Patient has a history of depression controlled on venlafaxine 75 mg p.o. 3 times daily.      Improved, but some hallucinations and agitation last night.  Increase risperidone in the evenings from 0.5 mg to 1.0 mg.  Status post quetiapine.  Sleeping and eating well.    Continue medications.  I counseled the patient and her son that there is a theoretical risk of a higher mortality rate in patients placed on antipsychotics with dementia.  I emphasized that the patient is on a very low-dose of an antipsychotic. They voiced understanding, and agreed to proceed.    Follow up on EKG, CBC, TSH, BMP, B12 and folate.  They did not receive these order requisitions, so I will resend to Francisco Javier's address.    Request for records from [facility] for labs.    4 wks.       Plan  · Orders  o Prolactin Level (Serum) (13134) - 294.8/F03.90, 311/F32.9, 300.02/F41.1 - 01/19/2021  o ACO-39: Current medications updated and reviewed (, 1159F) - - 01/19/2021  · Medications  o risperidone 0.5 mg oral tablet   SIG: take 0.5 tablet by oral route at 8 pm (in addition to her other 0.5 mg dose)   DISP: (90) Tablet with 1 refills  Prescribed on 01/19/2021     o Medications have been Reconciled  o Transition of Care or Provider Policy  · Instructions  o Risk Assessment: Risk of self-harm acutely is low. Risk factors include significant psychosocial stressor involved in moving to an assisted living facility, possible depressive symptoms, possible anxiety symptoms. Protective factors include no access to weapons, no present SI, no history of self-harm or suicide attempts, no family history, no AODA, healthcare seeking, future oriented, willingness to engage in care. Risk of self-harm chronically is also  low, but could be further elevated in the event of treatment noncompliance and/or AODA.  o Safety: No acute safety concerns.  o Medications: Continue venlafaxine 75 mg p.o. 3 times daily, low dose melatonin (3 mg) in the evenings at 5 pm to target sundowning. INCREASE risperidone to 0.5 mg PO QDAY and 1.0 mg QHS (3 pm and 8 pm) to target insomnia, agitation, AVH, delusions. Risks, benefits discussed with patient including sedation, dizziness, GI upset, nausea and vomiting, increased prolactin levels, tardive dyskinesia. Also discussed theoretical risk of increased mortality on an antipsychotic. After discussion of these risks and benefits, the patient and her son voiced understanding and agreed to proceed. *** s/p quetiapine.  o Therapy: Possibly in the future.  o Labs/Studies: CBC, BMP, TSH, free T4, B12, and folate. Also an EKG to assess QTC.  o Follow Up: 4 wks.  · Disposition  o Follow Up in 1 month.            Electronically Signed by: Edy Li MD -Author on January 19, 2021 09:52:40 AM

## 2021-05-14 NOTE — PROGRESS NOTES
"   Progress Note      Patient Name: Denisse Malave   Patient ID: [id number]   Sex: Female   YOB: 1945    Primary Care Provider: Geeta Patterson MD   Referring Provider: Geeta Patterson MD    Visit Date: January 5, 2021    Provider: Edy Li MD   Location: [facility]   Location Address: [address] [place] [address]  [id number]   Location Phone: [phone]          Chief Complaint     \"Her behavior has improved a little.\"       History Of Present Illness  Denisse Malave is a 75 year old /White female who presents to the office today referred by Geeta Patterson MD.   Chart: Seen 9/30 by [place]. Falls (2-3x/mo) and gait abnormalities.  passed in 2013. Shuffling gait; uses rollator. MRI: frontotemporal atrophy and ventriculomegaly. Diffuse white matter changes. Incontinent. Likely vascular dementia. On venlafaxine XR 75 mg TID. Outside records (from [facility] and Geeta Patterson, scanned in 11/3, [facility]) show patient was \"very depressed and cries a lot\" at her assisted living facility. PHQ-9 is 18. CKD stage 3, H/o stroke, HLD, HTN, T2DM, TBI?.   Virtual visit via Zoom audio and video due to the COVID-19 pandemic. Patient is accepting of and agreeable to appointment. The appointment consisted of the patient and I only. Interview: Patient presents with her son Francisco Javier. Francisco Javier did most of the talking. Reports some improvement on risperidone, low-dose, at 3 PM each day. Still some issues with insomnia. I ruled out Lewy body dementia today, as Francisco Javier informed me the patient does not have a tremor, has had a shuffling gait for years which did not worsen on the quetiapine or the risperidone. Francisco Javier also confirmed that the patient does not have symptoms suggestive of REM sleep behavior disorder, such as acting out her dreams physically in her sleep. Francisco Javier states Denisse still \"goes off\" at around 3 PM during the day, but not lately. Denisse confirmed. Denisse " informed us that she did not have a very good holiday. Definitely some confusion present.   No SI HI AVH. On the phone yesterday, Francisco Javier informing the patient continues to have visual hallucinations of people visiting her. She also experiences the delusion that an innocent member of the facility she lives at is having an affair with her  (who has passed away.)   ...   ...       Past Medical History  Disease Name Date Onset Notes   Anxiety --  --    Cancer of leg --  --    Dementia --  --    Depression, unspecified depression type --  --    Diabetes --  --    Dizziness --  --    Hearing loss --  --    HTN (hypertension) --  --    Hyperlipemia --  --    Vertigo --  --          Past Surgical History  Procedure Name Date Notes   EAR Surgery --  --    Gallbladder --  --    Hysterectomy --  --    Open bariatric bypass surgery --  --    Shoulder surgery --  --    Thyroid surgery --  --          Medication List  Name Date Started Instructions   acetaminophen 500 mg oral tablet  --    amlodipine 5 mg oral tablet  take 1 tablet (5 mg) by oral route once daily   Basaglar KwikPen U-100 Insulin 100 unit/mL (3 mL) subcutaneous insulin pen  --    Crestor 10 mg oral tablet  take 1 tablet (10 mg) by oral route once daily at bedtime   losartan 100 mg oral tablet  take 1 tablet (100 mg) by oral route once daily   melatonin 3 mg oral capsule  take 1 capsule by oral route once a day (at bedtime)   metformin 1,000 mg oral tablet  take 1 tablet by oral route daily   metoprolol succinate 50 mg oral tablet extended release 24 hr  take 1 tablet (50 mg) by oral route once daily   Risperdal 0.5 mg oral tablet 01/05/2021 take 1 tablet by oral route BID (at 3 pm and 8 pm)   venlafaxine 75 mg oral tablet  take 1 tablet (75 mg) by oral route 3 times per day with food         Allergy List  Allergen Name Date Reaction Notes   NO KNOWN DRUG ALLERGIES --  --  --          Family Medical History  Disease Name Relative/Age Notes   Family history of  "heart disease  --    Family history of diabetes mellitus  --    Family history of hypertension Mother/  Sister/   --          Social History  Finding Status Start/Stop Quantity Notes   Alcohol --  --/-- --  01/05/2021 - 11/03/2020 -    Tobacco Never --/-- --  09/30/2020 - 09/30/2020 -          Review of Systems  · Constitutional  o Admits  o : fatigue  o Denies  o : night sweats  · Eyes  o Admits  o : double vision, blurred vision  · HENT  o Admits  o : vertigo, recent head injury  · Breasts  o Denies  o : abnormal changes in breast size, additional breast symptoms except as noted in the HPI  · Cardiovascular  o Denies  o : chest pain, irregular heart beats  · Respiratory  o Admits  o : shortness of breath  o Denies  o : productive cough  · Gastrointestinal  o Admits  o : nausea  o Denies  o : vomiting  · Genitourinary  o Denies  o : dysuria, urinary retention  · Integument  o Denies  o : hair growth change, new skin lesions  · Neurologic  o Admits  o : altered mental status  o Denies  o : seizures  · Musculoskeletal  o Admits  o : joint swelling, limitation of motion  · Endocrine  o Denies  o : cold intolerance, heat intolerance  · Psychiatric  o Admits  o : depression, hallucinations, feeling confused, difficulty sleeping, excessive anger, behavioral problems  · Heme-Lymph  o Denies  o : petechiae, lymph node enlargement or tenderness  · Allergic-Immunologic  o Denies  o : frequent illnesses      Physical Examination  · Mental Status Exam  o Appearance  o : good eye contact, normal street clothes, groomed, sitting at a table  o Behavior  o : pleasant and cooperative  o Motor  o : Psychomotor retardation  o Speech  o : normal rhythm, rate, volume, tone, not hyperverbal, not pressured, normal prosidy  o Mood  o : \"Good\"  o Affect  o : euthymic  o Thought Content  o : negative suicidal ideations, negative homicidal ideations, negative obsessions  o Perceptions  o : negative auditory hallucinations, negative visual " hallucinations  o Thought Process  o : Impaired due to dementia  o Insight/Judgement  o : Impaired due to dementia  o Cognition  o : Impaired due to dementia  o Attention  o : intact, possibly hard of hearing          Assessment  · Anxiety     300.02/F41.1  · Depression, unspecified depression type     311/F32.9  · Dementia     294.8/F03.90       Presentation most consistent with adjustment disorder related to moving from a home to an assisted living facility.  Patient is now adjusting better.  Also vascular dementia, with likely sundowning. Duval score is 4 today.  Patient has a history of depression controlled on venlafaxine 75 mg p.o. 3 times daily.      Some agitation. Discontinued quetiapine and started risperidone 0.5 mg at 3 pm, which is helping. Some insomnia. Add a dose of risperidone in the evenings.    Continue medications.  I counseled the patient and her son that there is a theoretical risk of a higher mortality rate in patients placed on antipsychotics with dementia.  I emphasized that the patient is on a very low-dose of an antipsychotic. They voiced understanding, and agreed to proceed.    Follow up on EKG, CBC, TSH, BMP, B12 and folate.    Patient is comfortable with her primary care physician seeing these notes.    See back in 2 wks.       Plan  · Orders  o ACO-39: Current medications updated and reviewed (, 1159F) - - 01/05/2021  · Medications  o Risperdal 0.5 mg oral tablet   SIG: take 1 tablet by oral route BID (at 3 pm and 8 pm)   DISP: (60) Tablet with 1 refills  Adjusted on 01/05/2021     o Medications have been Reconciled  o Transition of Care or Provider Policy  · Instructions  o Risk Assessment: Risk of self-harm acutely is low. Risk factors include significant psychosocial stressor involved in moving to an assisted living facility, possible depressive symptoms, possible anxiety symptoms. Protective factors include no access to weapons, no present SI, no history of self-harm or suicide  attempts, no family history, no AODA, healthcare seeking, future oriented, willingness to engage in care. Risk of self-harm chronically is also low, but could be further elevated in the event of treatment noncompliance and/or AODA.  o Safety: No acute safety concerns.  o Medications: Continue venlafaxine 75 mg p.o. 3 times daily, low dose melatonin (3 mg) in the evenings at 5 pm to target sundowning. INCREASE risperidone to 0.5 mg PO BID (3 pm and 8 pm) to target insomnia, agitation, AVH, delusions. Risks, benefits discussed with patient including sedation, dizziness, GI upset, nausea and vomiting, increased prolactin levels, tardive dyskinesia. Also discussed theoretical risk of increased mortality on an antipsychotic. After discussion of these risks and benefits, the patient and her son voiced understanding and agreed to proceed. *** s/p quetiapine.  o Therapy: Possibly in the future.  o Labs/Studies: CBC, BMP, TSH, free T4, B12, and folate. Also an EKG to assess QTC.  o Follow Up: 2 wks.  · Disposition  o Follow Up in 2 weeks.            Electronically Signed by: Edy Li MD -Author on January 5, 2021 10:04:03 AM

## 2021-05-14 NOTE — PROGRESS NOTES
"   Progress Note      Patient Name: Denisse Malave   Patient ID: [id number]   Sex: Female   YOB: 1945    Primary Care Provider: Geeta Patterson MD   Referring Provider: Geeta Patterson MD    Visit Date: March 2, 2021    Provider: Edy Li MD   Location: [facility]   Location Address: [address] [place] [address]  [id number]   Location Phone: [phone]          Chief Complaint     \"I am a little depressed but it is 5:00.\"       History Of Present Illness  Denisse Malave is a 75 year old /White female who presents to the office today referred by Geeta Patterson MD.   Chart: Seen 9/30 by Bailey. Falls (2-3x/mo) and gait abnormalities.  passed in 2013. Shuffling gait; uses rollator. MRI: frontotemporal atrophy and ventriculomegaly. Diffuse white matter changes. Incontinent. Likely vascular dementia. On venlafaxine XR 75 mg TID. Outside records (from [facility] and Geeta Patterson, scanned in 11/3, [facility]) show patient was \"very depressed and cries a lot\" at her assisted living facility. PHQ-9 is 18. CKD stage 3, H/o stroke, HLD, HTN, T2DM, TBI?.   \"Denisse and Francisco Javier.\" Virtual visit via Zoom audio and video due to the COVID-19 pandemic. Patient is accepting of and agreeable to appointment. The appointment consisted of the patient and I only. Interview: Patient presents with her son Francisco Javier. They both did about the same amount of talking. Patient was transferred to [facility] in [facility], where she stayed for 7 days. She was removed from risperidone and started on Seroquel 12.5 mg 3 times daily. Francisco Javier reports her mood is much better except around 5 PM during the day. Denisse agrees. She continues to have movement issues, and they have actually gotten worse (following). Denies SI HI AVH. Denies seeing her father or her grandson. She is status post vaccination she is presently at the full care side of her facility, and the goal is get her to " the assisted living side of the facility where she has more freedom. Sleeping well.   Patient has mobility issues and uses a walker.   ...   ...       Past Medical History  Disease Name Date Onset Notes   Anxiety --  --    Cancer of leg --  --    Dementia --  --    Depression, unspecified depression type --  --    Diabetes --  --    Dizziness --  --    Hearing loss --  --    HTN (hypertension) --  --    Hyperlipemia --  --    Vertigo --  --          Past Surgical History  Procedure Name Date Notes   EAR Surgery --  --    Gallbladder --  --    Hysterectomy --  --    Open bariatric bypass surgery --  --    Shoulder surgery --  --    Thyroid surgery --  --          Medication List  Name Date Started Instructions   acetaminophen 500 mg oral tablet  --    amlodipine 5 mg oral tablet  take 1 tablet (5 mg) by oral route once daily   Basaglar KwikPen U-100 Insulin 100 unit/mL (3 mL) subcutaneous insulin pen  --    Crestor 10 mg oral tablet  take 1 tablet (10 mg) by oral route once daily at bedtime   ergocalciferol (vitamin D2) 1,250 mcg (50,000 unit) oral capsule  take 1 capsule by oral route every 7 days   losartan 100 mg oral tablet  take 1 tablet (100 mg) by oral route once daily   melatonin 3 mg oral capsule  take 1 capsule by oral route once a day (at bedtime)   metformin 1,000 mg oral tablet  take 1 tablet by oral route daily   metoprolol succinate 50 mg oral tablet extended release 24 hr  take 1 tablet (50 mg) by oral route once daily   Risperdal 0.5 mg oral tablet 01/05/2021 take 1 tablet by oral route BID (at 3 pm and 8 pm)   Seroquel oral  12.5 mg 3x a day   venlafaxine 75 mg oral tablet  take 1 tablet (75 mg) by oral route 3 times per day with food         Allergy List  Allergen Name Date Reaction Notes   NO KNOWN DRUG ALLERGIES --  --  --          Family Medical History  Disease Name Relative/Age Notes   Family history of heart disease  --    Family history of diabetes mellitus  --    Family history of  "hypertension Mother/  Sister/   --          Social History  Finding Status Start/Stop Quantity Notes   Alcohol --  --/-- --  01/19/2021 - 01/05/2021 - 11/03/2020 -    Tobacco Never --/-- --  09/30/2020 - 09/30/2020 -          Review of Systems  · Constitutional  o Denies  o : fatigue, night sweats  · Eyes  o Denies  o : double vision, blurred vision  · HENT  o Admits  o : recent head injury  · Breasts  o Denies  o : abnormal changes in breast size, additional breast symptoms except as noted in the HPI  · Cardiovascular  o Denies  o : chest pain, irregular heart beats  · Respiratory  o Denies  o : shortness of breath, productive cough  · Gastrointestinal  o Denies  o : nausea, vomiting  · Genitourinary  o Denies  o : dysuria, urinary retention  · Integument  o Denies  o : hair growth change, new skin lesions  · Neurologic  o Denies  o : altered mental status, seizures  · Musculoskeletal  o Denies  o : joint swelling, limitation of motion  · Endocrine  o Denies  o : cold intolerance, heat intolerance  · Heme-Lymph  o Denies  o : petechiae, lymph node enlargement or tenderness  · Allergic-Immunologic  o Denies  o : frequent illnesses      Physical Examination  · Mental Status Exam  o Appearance  o : good eye contact, normal street clothes, groomed, sitting at a table  o Behavior  o : pleasant and cooperative  o Motor  o : Psychomotor retardation  o Speech  o : normal rhythm, rate, volume, tone, not hyperverbal, not pressured, normal prosidy  o Mood  o : \"A little depressed\"  o Affect  o : Some tears, but able to smile  o Thought Content  o : negative suicidal ideations, negative homicidal ideations, negative obsessions  o Perceptions  o : negative auditory hallucinations, negative visual hallucinations  o Thought Process  o : Impaired due to dementia  o Insight/Judgement  o : Impaired due to dementia  o Cognition  o : Impaired due to dementia  o Attention  o : intact, she is hard of " hearing          Assessment  · Anxiety     300.02/F41.1  · Depression, unspecified depression type     311/F32.9  · Dementia     294.8/F03.90       Presentation most consistent with adjustment disorder related to moving from a home to an assisted living facility.  Patient is now adjusting better.  Also vascular dementia, with likely sundowning.  Patient has a history of depression controlled on venlafaxine 75 mg p.o. 3 times daily.      Significantly improved.  Francisco Javier will give me the records of patient's admission to [facility].  It is unlikely that her hallucinations and paranoia resolved without managing some condition that led to delirium.    Continue medications.  Patient notes some depression, and we will work on this at the next visit in 6 weeks, but for now I do not want to make any medication changes as the patient is stable.      In the past, I counseled the patient and her son that there is a theoretical risk of a higher mortality rate in patients placed on antipsychotics with dementia.  I emphasized that the patient is on a very low-dose of an antipsychotic. They voiced understanding, and agreed to proceed.    Follow up on EKG, CBC, TSH, BMP, B12 and folate.  They did not receive these order requisitions, so I will resend to Francisco Javier's address.  These labs may have been performed at [facility].    Follow-up in 6 weeks.       Plan  · Orders  o ACO-39: Current medications updated and reviewed (, 1159F) - - 03/02/2021  · Medications  o Risperdal 0.5 mg oral tablet   SIG: take 1 tablet by oral route BID (at 3 pm and 8 pm)   DISP: (60) Tablet with 1 refills  Discontinued on 03/02/2021     o Medications have been Reconciled  o Transition of Care or Provider Policy  · Instructions  o Risk Assessment: Risk of self-harm acutely is low. Risk factors include significant psychosocial stressor involved in moving to an assisted living facility, possible depressive symptoms, possible anxiety symptoms. Protective factors  include no access to weapons, no present SI, no history of self-harm or suicide attempts, no family history, no AODA, healthcare seeking, future oriented, willingness to engage in care. Risk of self-harm chronically is also low, but could be further elevated in the event of treatment noncompliance and/or AODA.  o Safety: No acute safety concerns.  o Medications: Continue venlafaxine 75 mg p.o. 3 times daily, low dose melatonin (3 mg) in the evenings at 5 pm to target sundowning. CONTINUE Seroquel 12.5 mg PO TID to target insomnia, agitation. Risks, benefits discussed with patient including sedation, orthostatic hypotension, dizziness, GI upset, nausea and vomiting, tardive dyskinesia. Also discussed theoretical risk of increased mortality for an elderly patient with dementia on an antipsychotic. After discussion of these risks and benefits, the patient and her son voiced understanding and agreed to proceed. *** s/p risperidone.  o Therapy: Possibly in the future.  o Labs/Studies: CBC, BMP, TSH, free T4, B12, and folate. Also an EKG to assess QTC.  o Follow Up: 6 wks.  · Disposition  o Follow Up in 2 months.            Electronically Signed by: Edy Li MD -Author on March 2, 2021 05:55:39 PM

## 2021-05-15 VITALS
HEART RATE: 68 BPM | OXYGEN SATURATION: 99 % | BODY MASS INDEX: 30.48 KG/M2 | TEMPERATURE: 98.6 F | WEIGHT: 172 LBS | DIASTOLIC BLOOD PRESSURE: 66 MMHG | HEIGHT: 63 IN | SYSTOLIC BLOOD PRESSURE: 163 MMHG

## 2021-05-16 VITALS
WEIGHT: 178.12 LBS | DIASTOLIC BLOOD PRESSURE: 72 MMHG | BODY MASS INDEX: 31.56 KG/M2 | HEART RATE: 61 BPM | SYSTOLIC BLOOD PRESSURE: 144 MMHG | HEIGHT: 63 IN

## 2021-05-16 VITALS — HEART RATE: 63 BPM | SYSTOLIC BLOOD PRESSURE: 158 MMHG | DIASTOLIC BLOOD PRESSURE: 76 MMHG

## 2021-05-18 NOTE — PROGRESS NOTES
Denisse Malave 1945     Office/Outpatient Visit    Visit Date: Wed, Oct 23, 2019 02:51 pm    Provider: Geeta Patterson MD (Assistant: Windy Gutierres RN)    Location: [facility]        Electronically signed by Geeta Patterson MD on  10/23/2019 03:30:16 PM                             SUBJECTIVE:        CC:     Ms. Malave is a 74 year old White female.  Right earache;         HPI: Denisse is here today with acute complaint of R ear pain.  She got a new hearing aid for the R ears, 4 days ago.  Her ear has been very painful.  No drainage.  Decreased hearing at baseline but her hearing aid seems to be working.  She just can't hardly stand to have the hearing aid in there.  No fever or chills.  No rhinorrhea or congestion.  No sore thraot.     ROS:     CONSTITUTIONAL:  Negative for fatigue and fever.      EYES:  Negative for blurred vision.      E/N/T:  Positive for ear pain ( right ), diminished hearing and tinnitus at baseline.   Negative for nasal congestion, frequent rhinorrhea or sore throat.      CARDIOVASCULAR:  Negative for chest pain and palpitations.      RESPIRATORY:  Negative for recent cough and dyspnea.      MUSCULOSKELETAL:  Positive for arthralgias.   Negative for myalgias.          PMH/FMH/SH:     Last Reviewed on 10/23/2019 03:22 PM by Geeta Patterson    Past Medical History:             PREVENTIVE HEALTH MAINTENANCE             BONE DENSITY: was last done 8/15/18 with the following abnormality noted-- osteoporosis     COLORECTAL CANCER SCREENING: declines colorectal cancer screening, understands reason for testing     EYE EXAM: Diabetic Eye Exam during this calendar year and results are in chart was last done 9/13/2018     Hepatitis C Medicare Screening: was last done 7/10/18     MAMMOGRAM: Done within last 2 years and results in are chart was last done 8/22/2019 with normal results     PAP SMEAR: No longer indicated due to age and history s/p hysterectomy         PAST MEDICAL HISTORY  "        Positive for    Hyperlipidemia and    Hypertension;     Positive for    Chronic Renal Failure and    Urinary Incontinence;     Positive for    Type 2 Diabetes: complications include nephropathy and peripheral neuropathy; ;     Positive for    Cerebrovascular Accident;     Positive for    Pernicious Anemia;         CURRENT MEDICAL PROVIDERS:    Cardiologist: Dr Munroe    E/N/T: Dr Christianson    Ophthalmologist: Dr Shelby             ADVANCE DIRECTIVES: Durable Power of   has copy         Surgical History:         Biopsy of breast; benign    Cholecystectomy    Hysterectomy: d/t concern for uterine cancer but this was benign;      rotator cuff repair R shoulder;    \"ear surgery\";         Family History:     Father: Coronary Artery Disease     Mother: Lung Cancer     Brother(s): Coronary Artery Disease     Sister(s): Neurological/Genetic Myasthenia Gravis     Son(s): Coronary Artery Disease         Social History:     Occupation:    Retired     Marital Status:      Children: 2 children 1 , 1 son Francisco Javier         Tobacco/Alcohol/Supplements:     Last Reviewed on 10/23/2019 03:22 PM by Geeta Patterson    Tobacco: She has never smoked.          Substance Abuse History:     Last Reviewed on 10/23/2019 03:22 PM by Geeta Patterson        Mental Health History:     Last Reviewed on 10/23/2019 03:22 PM by Geeta Patterson        Communicable Diseases (eg STDs):     Last Reviewed on 10/23/2019 03:22 PM by Geeta Patterson            Current Problems:     Last Reviewed on 2019 04:28 PM by Geeta Patterson    Anxiety     Vertigo     Osteoporosis, other     Fall NOS     Hyperlipidemia     Hypertension     Type 2 diabetes     B12 deficiency     Chronic kidney disease, Stage III (moderate)     Personal history of transient ischemic attack (TIA), and cerebral infarction without residual deficits         Immunizations:     Prevnar 13 (Pneumococcal PCV 13) 2018         Allergies:     Last Reviewed " on 10/23/2019 02:53 PM by Windy Gutierres      No Known Drug Allergies.         Current Medications:     Last Reviewed on 10/23/2019 02:56 PM by Windy Gutierres    Metoprolol Succinate 50mg Tablets, Extended Release one tablet po q hs     Oxybutynin Chloride 5mg Tablet 1 tab daily     Crestor 10mg Tablet 1 tab daily     Losartan 100mg Tablet Take 1 tablet(s) by mouth daily     Metformin HCl 1,000mg Tablet 1 tab daily     Venlafaxine HCl 225mg Tablets, Extended Release Take 1 tablet(s) by mouth daily     Alendronate Sodium 70mg Tablet once a week     Amlodipine  5mg Tablet 1 tab daily     Accu-Chek Mary Plus Test Strips  Reagent Strips Test Blood Sugar qid DX E11.9     Capac (general)  Needle 31g 5mm pen needles, use as directed     Basaglar KwikPen 100units/1ml Injection 75 units QAM and 30 units QPM (Patient Assistance)     Humalog KwikPen 100units/1ml Pen System, Disposable Use as directed per sliding scale         OBJECTIVE:        Vitals:         Current: 10/23/2019 2:57:04 PM    Ht:  5 ft, 3.5 in;  Wt: 171 lbs;  BMI: 29.8    T: 98.1 F (oral);  BP: 155/68 mm Hg (left arm, sitting);  P: 83 bpm (left arm (BP Cuff), sitting);  sCr: 0.86 mg/dL;  GFR: 61.20        Exams:     PHYSICAL EXAM:     GENERAL: vital signs recorded - well developed, well nourished;  well groomed;  no apparent distress;     EYES: extraocular movements intact; conjunctiva and cornea are normal; PERRLA;     E/N/T: EARS: external auditory canal normal on the left, draining on the right, edematous on the right, and erythematous on the right;  bilateral TMs are normal;  OROPHARYNX:  normal mucosa, dentition, gingiva, and posterior pharynx;     RESPIRATORY: normal respiratory rate and pattern with no distress; normal breath sounds with no rales, rhonchi, wheezes or rubs;     CARDIOVASCULAR: normal rate; rhythm is regular;  no systolic murmur; no edema;     MUSCULOSKELETAL: normal gait; normal overall tone         ASSESSMENT           380.11    H60.331  Acute otitis externa              DDx:         ORDERS:         Meds Prescribed:       Ciprodex (Ciprofloxacin/Dexamethasone) Otic Suspension 3 to 4 gtts tid  to right ear X 1 week  #5 (Five) ml Refills: 0                 PLAN:          Acute otitis externa +Acute OE of the R ear.  Wears a hearing aid.  Treating with Ciprodex drops as below.           Prescriptions:       Ciprodex (Ciprofloxacin/Dexamethasone) Otic Suspension 3 to 4 gtts tid  to right ear X 1 week  #5 (Five) ml Refills: 0             CHARGE CAPTURE           **Please note: ICD descriptions below are intended for billing purposes only and may not represent clinical diagnoses**        Primary Diagnosis:         380.11 Acute otitis externa            H60.331    Swimmer's ear, right ear              Orders:          [id number]   Office/outpatient visit; established patient, level 3  (In-House)

## 2021-05-18 NOTE — PROGRESS NOTES
Denisse Malave  1945     Office/Outpatient Visit    Visit Date: Wed, Sep 2, 2020 03:22 pm    Provider: Dee Ruelas N.P. (Assistant: Windy Gutierres RN)    Location: [facility]        Electronically signed by Dee Ruelas N.P. on  09/02/2020 04:33:20 PM                             Subjective:        CC: Ms. Malave is a 75 year old White female.  left earache;         HPI: Denisse is here today with c/o left ear pain. She notes that has been over the past few days. She also reports history of what she describes as implant in her ear about 20 years ago. This was performed in [place]. She reports that this fell out this past week. Denies drainage, fever. She saw ENT last year for hearing loss and vertigo. Wears hearing aid in right ear.    ROS:     CONSTITUTIONAL:  Negative for chills and fever.      EYES:  Negative for eye drainage and eye pain.      E/N/T:  Positive for ear pain ( left ) and diminished hearing.      CARDIOVASCULAR:  Negative for chest pain and palpitations.      RESPIRATORY:  Negative for dyspnea and frequent wheezing.      NEUROLOGICAL:  Negative for dizziness and headaches.          Past Medical History / Family History / Social History:         Last Reviewed on 8/05/2020 10:06 AM by Geeta Patterson    Past Medical History:             PREVENTIVE HEALTH MAINTENANCE             BONE DENSITY: was last done 8/31/2020 with the following abnormality noted-- osteoporosis in hips (worsening despite Fosamax) on Fosamax     COLORECTAL CANCER SCREENING: declines colorectal cancer screening, understands reason for testing     EYE EXAM: Diabetic Eye Exam during this calendar year and results are in chart was last done 9/17/19 The next one is due  6 months mild/moderate diabetic retinopathy     Hepatitis C Medicare Screening: was last done 7/10/18     MAMMOGRAM: Done within last 2 years and results in are chart was last done 8/31/2020 with normal results     PAP SMEAR: No longer  "indicated due to age and history s/p hysterectomy         PAST MEDICAL HISTORY         Positive for    Hyperlipidemia and    Hypertension;     Positive for    Chronic Renal Failure and    Urinary Incontinence;     Positive for    Type 2 Diabetes: complications include nephropathy and peripheral neuropathy; ;     Positive for    Cerebrovascular Accident;     Positive for    Pernicious Anemia;         CURRENT MEDICAL PROVIDERS:    Cardiologist: Dr Munroe    Endocrinologist: Ira France NP    E/N/T: Dr Christianson    Ophthalmologist: Dr Shelby             ADVANCE DIRECTIVES: Durable Power of   has copy         Surgical History:         Biopsy of breast; benign    Cholecystectomy    Hysterectomy: d/t concern for uterine cancer but this was benign;     rotator cuff repair R shoulder;    \"ear surgery\";         Family History:     Father: Coronary Artery Disease     Mother: Lung Cancer     Brother(s): Coronary Artery Disease     Sister(s): Neurological/Genetic Myasthenia Gravis     Son(s): Coronary Artery Disease         Social History:     Occupation: Retired (Prior occupation: )     Marital Status:      Children: 2 children 1 , 1 son Francisco Javier         Functional Status: relies on her siblings for assistance with care         Tobacco/Alcohol/Supplements:     Last Reviewed on 2020 10:06 AM by Geeta Patterson    Tobacco: She has never smoked.  Non-drinker         Substance Abuse History:     Last Reviewed on 2020 10:06 AM by Geeta Patterson        Mental Health History:     Last Reviewed on 2020 10:06 AM by Geeta Patterson        Communicable Diseases (eg STDs):     Last Reviewed on 2020 10:06 AM by Geeta Patterson        Current Problems:     Last Reviewed on 2020 10:06 AM by Geeta Patterson    Vitamin B12 defic anemia due to intrinsic factor deficiency    CKD - Chronic kidney disease, stage 3 (moderate)    H/o stroke - Personal history of transient ischemic " attack (TIA), and cerebral infarction without residual deficits    HLD - Mixed hyperlipidemia    HTN - Essential (primary) hypertension    Other osteoporosis without current pathological fracture    Benign paroxysmal vertigo, left ear    Anxiety disorder, unspecified    Type 2 diabetes mellitus with hyperglycemia    Repeated falls    Encounter for screening for depression    Muscle weakness (generalized)    Unspecified injury of right elbow, initial encounter    Age-related cognitive decline    Postconcussional syndrome    Unspecified injury of head, initial encounter    Intercostal pain    Acute maxillary sinusitis, unspecified    Encounter for other administrative examinations    Encounter for screening mammogram for malignant neoplasm of breast    Ataxia, unspecified    Encounter for general adult medical examination without abnormal findings        Immunizations:     Prevnar 13 (Pneumococcal PCV 13) 7/18/2018    Pneumococcal, 23-valent IM/SC (adult and pt >=2yr) 10/31/2019        Allergies:     Last Reviewed on 8/05/2020 10:06 AM by Geeta Patterson    oxybutynin chloride: Hallucinations,Dizzy  (Adverse Reaction)        Current Medications:     Last Reviewed on 9/02/2020 03:27 PM by Windy Gutierres    Losartan 100 mg oral tablet [Take 1 tablet(s) by mouth daily]    metFORMIN 1,000 mg oral tablet [1 tab daily]    metoprolol succinate 50 mg oral Tablet, Extended Release 24 hr [TAKE ONE TABLET BY MOUTH EVERY NIGHT AT BEDTIME]    Amlodipine  5 mg oral tablet [1 tab daily]    Crestor 10 mg oral tablet [1 tab daily]    Basaglar KwikPen U-100 Insulin 100 unit/mL (3 mL) subcutaneous Insulin Pen [75 units QAM (Patient Assistance)]    HumaLOG KwikPen Insulin 100 unit/mL subcutaneous Insulin Pen [Administer based on meal size: small=15 units, med=20 units, large=25 units; plus BS >150 give: 2 units 151-175 and increase by 1 unit for every 25 mg/dL thereafter (patient Assistance)]    BD Ultra-Fine Mini Pen Needle 31 gauge x  "3/16\"  [USE AS DIRECTED]    Accu-Chek Mary Plus Test Strips  Reagent Strips [Test Blood Sugar qid DX E11.9]    alendronate 70 mg oral tablet [TAKE ONE TABLET BY MOUTH ONCE WEEKLY]    venlafaxine 75 mg oral tablet [TAKE THREE TABLETS BY MOUTH DAILY WITH FOOD]        Objective:        Vitals:         Historical:     8/5/2020  BP:   194/91 mm Hg ( (right arm, , sitting, );) 6/16/2020  BP:   145/67 mm Hg ( (left arm, , sitting, );) 8/5/2020  P:   82bpm ( (right arm (BP Cuff), , sitting, );) 6/16/2020  P:   66bpm ( (left arm (BP Cuff), , sitting, );) 8/5/2020  Wt:   172lbs    Current: 9/2/2020 3:31:04 PM    Ht:  5 ft, 3.5 in;  Wt: 171.2 lbs;  BMI: 29.9T: 98 F (temporal);  BP: 161/68 mm Hg (right arm, sitting);  P: 78 bpm (right arm (BP Cuff), sitting);  sCr: 1.08 mg/dL;  GFR: 48.06        Repeat:     3:36:39 PM  BP:   147/74mm Hg (left arm, sitting)     Exams:     PHYSICAL EXAM:     GENERAL: well developed, well nourished;  no apparent distress;     E/N/T: EARS: external auditory canal normal;  the left TM is red and right TM is normal;  NOSE: normal turbinates; no sinus tenderness;     RESPIRATORY: normal respiratory rate and pattern with no distress; normal breath sounds with no rales, rhonchi, wheezes or rubs;     CARDIOVASCULAR: normal rate; rhythm is regular;     NEUROLOGIC: mental status: alert and oriented x 3;     PSYCHIATRIC: appropriate affect and demeanor;         Assessment:         H66.92   Otitis media, unspecified, left ear       B37.9   Candidiasis, unspecified           ORDERS:         Meds Prescribed:       [New Rx] amoxicillin 875 mg oral tablet [take 1 tablet (875 mg) by oral route every 12 hours X 10 days], #20 (twenty) tablets, Refills: 0 (zero)       [New Rx] Diflucan 150 mg oral tablet [take 1 tablet (150 mg) by oral route once if develop yeast infection], #1 (one) tablet, Refills: 0 (zero)                 Plan:         Otitis media, unspecified, left earWill treat with oral antibiotic for OM. " There are no other acute abnormalities noted on exam today. She has appointment already scheduled later this month with ENT and recommend that she keep this appointment.          Prescriptions:       [New Rx] amoxicillin 875 mg oral tablet [take 1 tablet (875 mg) by oral route every 12 hours X 10 days], #20 (twenty) tablets, Refills: 0 (zero)         Candidiasis, unspecified          Prescriptions:       [New Rx] Diflucan 150 mg oral tablet [take 1 tablet (150 mg) by oral route once if develop yeast infection], #1 (one) tablet, Refills: 0 (zero)             Charge Capture:         Primary Diagnosis:     H66.92  Otitis media, unspecified, left ear           Orders:      [id number]  Office/outpatient visit; established patient, level 3  (In-House)              B37.9  Candidiasis, unspecified         ADDENDUMS:      ____________________________________    Addendum: 09/05/2020 08:38 AM - Dee Ruelas            Remove    B37.9    AC        Addendum: 11/16/2020 01:13 PM - Five, Team         Visit Note Faxed to:        User Entered Recipient; Number [phone]

## 2021-05-18 NOTE — PROGRESS NOTES
Denisse Malave 1945     Office/Outpatient Visit    Visit Date: Wed, Jul 31, 2019 09:22 am    Provider: Geeta Patterson MD (Assistant: Laura Solorzano MA)    Location: [facility]        Electronically signed by Geeta Patterson MD on  07/31/2019 12:37:08 PM                             SUBJECTIVE:        CC:     Ms. Malave is a 74 year old White female.  needs refill for needles,  pt states [facility] didnt get scripts and shes only taking oxybutynin, metoprolol, venlafaxine and losartan;         HPI:     She is due for colonoscopy.  Pap smear no longer indicated by age and history; s/p hysterectomy.  She is UTD on mammogram, last done 8/2018 and this was normal.  She is UTD on DEXA, last done 8/2018 and this showed osteoporosis.  She is UTD on Prevnar (7/2018).  She is due for Pneumovax, Shingrix, Havrix, Td.  Flu shot not currently indicated.  She is due for routine labs including DM panel.  She is UTD on eye exam (9/2018).  She is UTD on foot exam (4/2019).      Ms. Malave is here for a Medicare wellness visit.  ADVANCE DIRECTIVES (Please update in PMH): Durable Power of  on file         Returning to health checkup, self-Assessment of Health: She rates her health as poor. She rates her confidence of being able to control/manage most of her health problems as not very confident. Her physical/emotional health has limited her social activites not at all.  A review of possible cognitive impairment was performed and the following was noted: she is having difficulty driving;  memory changes are noted;  she is not having trouble with her finances A review of functional ability, including bathing, dressing, walking, and urine/bowel continence as well as level of safety was performed and was found to be negative.  Falls Risk: Has fallen 2 or more times or had one fall with injury in the past year.  In regard to hearing, she reports wearing hearing aid(s), but not having trouble hearing the TV/radio  when others do not or having to strain to hear or understand conversations.  Concerning home safety, she reports that at home she DOES have adequate lighting, a skid resistant shower/tub, grab bars in the bath, handrails on stairs, functioning smoke alarms and absence of throw rugs.  Physical Activity: She never excercises.; Type of diet patient normally eats is described as well-balanced with fruits and vegetables Tobacco: She has never smoked.  Preventative Health updated today         PHQ-9 Depression Screening: Completed form scanned and in chart; Total Score 14 Alcohol Consumption Screening: Completed form scanned and in chart; Total Score 0     ROS:     CONSTITUTIONAL:  Negative for fatigue and fever.      EYES:  Negative for blurred vision.      E/N/T:  Positive for diminished hearing, tinnitus and nasal congestion.   Negative for frequent rhinorrhea.      CARDIOVASCULAR:  Negative for chest pain and palpitations.      RESPIRATORY:  Negative for recent cough and dyspnea.      GASTROINTESTINAL:  Negative for abdominal pain, constipation, diarrhea, nausea and vomiting.      GENITOURINARY:  Negative for dysuria and urinary incontinence.      MUSCULOSKELETAL:  Positive for arthralgias.   Negative for myalgias.      NEUROLOGICAL:  Positive for ataxia, dizziness, vertigo and weakness ( bilateral lower extremity ).   Negative for paresthesias.      PSYCHIATRIC:  Negative for anxiety, depression and sleep disturbance.          PMH/FMH/SH:     Last Reviewed on 4/22/2019 04:28 PM by Geeta Patterson    Past Medical History:             PREVENTIVE HEALTH MAINTENANCE             BONE DENSITY: was last done 8/15/18 with the following abnormality noted-- osteoporosis     COLORECTAL CANCER SCREENING: declines colorectal cancer screening, understands reason for testing     EYE EXAM: Diabetic Eye Exam during this calendar year and results are in chart was last done 9/13/2018     Hepatitis C Medicare Screening: was last done  "7/10/18     MAMMOGRAM: Done within last 2 years and results in are chart was last done 8/15/2018 with normal results     PAP SMEAR: No longer indicated due to age and history s/p hysterectomy         PAST MEDICAL HISTORY         Positive for    Hyperlipidemia and    Hypertension;     Positive for    Chronic Renal Failure and    Urinary Incontinence;     Positive for    Type 2 Diabetes: complications include nephropathy and peripheral neuropathy; ;     Positive for    Cerebrovascular Accident;     Positive for    Pernicious Anemia;         CURRENT MEDICAL PROVIDERS:    Cardiologist: Dr Munroe    E/N/T: Dr Christianson    Ophthalmologist: Dr Shelby             ADVANCE DIRECTIVES: Durable Power of   has copy         Surgical History:         Biopsy of breast; benign    Cholecystectomy    Hysterectomy: d/t concern for uterine cancer but this was benign;      rotator cuff repair R shoulder;    \"ear surgery\";         Family History:     Father: Coronary Artery Disease     Mother: Lung Cancer     Brother(s): Coronary Artery Disease     Sister(s): Neurological/Genetic Myasthenia Gravis     Son(s): Coronary Artery Disease         Social History:     Occupation:    Retired     Marital Status:      Children: 2 children 1 , 1 son Francisco Javier         Tobacco/Alcohol/Supplements:     Last Reviewed on 2019 09:29 AM by Laura Solorzano    Tobacco: She has never smoked.          Substance Abuse History:     Last Reviewed on 2019 04:28 PM by Geeta Patterson        Mental Health History:     Last Reviewed on 2019 04:28 PM by Geeta Patterson        Communicable Diseases (eg STDs):     Last Reviewed on 2019 04:28 PM by Geeta Patterson            Current Problems:     Last Reviewed on 2019 04:28 PM by Geeta Patterson    Anxiety     Vertigo     Osteoporosis, other     Fall NOS     Hyperlipidemia     Hypertension     Type 2 diabetes     B12 deficiency     Chronic kidney disease, Stage III (moderate) "     Personal history of transient ischemic attack (TIA), and cerebral infarction without residual deficits         Immunizations:     Prevnar 13 (Pneumococcal PCV 13) 7/18/2018         Allergies:     Last Reviewed on 4/22/2019 04:28 PM by Geeta Patterson      No Known Drug Allergies.         Current Medications:     Last Reviewed on 4/22/2019 04:28 PM by Geeta Patterson    Venlafaxine HCl 225mg Tablets, Extended Release Take 1 tablet(s) by mouth daily     Crestor 10mg Tablet 1 tab daily     Metoprolol Succinate 50mg Tablets, Extended Release one tablet po q hs     Oxybutynin Chloride 5mg Tablet 1 tab daily     Losartan 100mg Tablet Take 1 tablet(s) by mouth daily     Accu-Chek Mary Plus Test Strips  Reagent Strips Test Blood Sugar qid DX E11.9     [place] (general)  Needle 31g 5mm pen needles, use as directed     Metformin HCl 1,000mg Tablet 1 tab daily     Amlodipine  5mg Tablet 1 tab daily     Basaglar KwikPen 100units/1ml Injection 75 units SC QAM (Patient Assistance)     Humalog KwikPen 100units/1ml Pen System, Disposable Use as directed per sliding scale         OBJECTIVE:        Vitals:         Current: 7/31/2019 9:38:45 AM    Ht:  5 ft, 3.5 in;  Wt: 169 lbs;  BMI: 29.5    T: 97.2 F (oral);  BP: 157/74 mm Hg (right arm, sitting);  P: 70 bpm (right arm (BP Cuff), sitting);  sCr: 1.03 mg/dL;  GFR: 50.85    VA: 20/100 OD, 20/70 OS (without correction)        Repeat:     10:31:59 AM     BP:   152/80mm Hg (right arm, sitting)         Exams:     PHYSICAL EXAM:     GENERAL: vital signs recorded - well developed, well nourished;  well groomed;  no apparent distress;     EYES: extraocular movements intact; conjunctiva and cornea are normal; PERRLA;     E/N/T: OROPHARYNX:  normal mucosa, dentition, gingiva, and posterior pharynx;     RESPIRATORY: normal respiratory rate and pattern with no distress; normal breath sounds with no rales, rhonchi, wheezes or rubs;     CARDIOVASCULAR: normal rate; rhythm is regular;  no  systolic murmur; no edema;     GASTROINTESTINAL: nontender; normal bowel sounds;     MUSCULOSKELETAL: normal gait; normal overall tone     NEUROLOGIC: mental status: alert and oriented x 3; Reflexes: brachioradialis: 2+; knee jerks: 2+;     PSYCHIATRIC: appropriate affect and demeanor; normal psychomotor function; normal speech pattern;         ASSESSMENT           V70.0   Z00.00  Health checkup              DDx:     V79.0   Z13.89  Screening for depression              DDx:     250.00   E11.9  Type 2 diabetes              DDx:     266.2   D51.0  B12 deficiency              DDx:     733.09   M81.8  Osteoporosis, other              DDx:     401.1   I10  Hypertension              DDx:     272.4   E78.2  Hyperlipidemia              DDx:     585.3   N18.3  Chronic kidney disease, Stage III (moderate)              DDx:     V03.82   Z23  Vaccination against pneumococcal pneumonia              DDx:         ORDERS:         Meds Prescribed:       Refill of: Amlodipine  5mg Tablet 1 tab daily  #90 (Ninety) tablet(s) Refills: 1       Alendronate Sodium 70mg Tablet once a week  #12 (Twelve) tablet(s) Refills: 3         Radiology/Test Orders:       3014F  Screening mammography results documented and reviewed (PV)1  (In-House)         2022F  Dilated retinal eye exam w/interpret by ophthalmologist/optometrist documented/reviewed (DM)4  (In-House)           Lab Orders:       60043  VB12 - [facility] Vitamin B12  (Send-Out)         96454  DIAB1 - [facility] LIPID,CMP, A1C: 93270, 16336, 06545  (Send-Out)         36326  CHRIS - [facility] Microablbumin, quantitative  (Send-Out)         APPTO  Appointment need  (In-House)           Procedures Ordered:         Annual wellness visit, includes a PPPS, subsequent visit  (In-House)         35089  Pneumococcal polysaccharide vaccine, 23-valent, adult or immunosuppressed patient dosage, for use in  (In-House)           Other Orders:         Depression screen positive and follow up plan documented   (In-House)           Negative EtOH screen  (In-House)         1100F  Pt screen for fall risk; document 2+ falls in the past yr or any fall w/injury in past year (MICHAEL)  (In-House)           Administration of pneumococcal vaccine (x1)                 PLAN:          Health checkup She is due for colonoscopy.  Pap smear no longer indicated by age and history; s/p hysterectomy.  She is UTD on mammogram, last done 8/2018 and this was normal.  She is UTD on DEXA, last done 8/2018 and this showed osteoporosis.  We are going to start her on Fosamax.   She is UTD on Prevnar (7/2018).  She is due for Pneumovax, Shingrix, Havrix, Td.  Flu shot not currently indicated.  She is due for routine labs including DM panel; ordered.  She is UTD on eye exam (9/2018).  She is UTD on foot exam (4/2019).  She did have a couple of falls just this past weekend, but no injury.  No memory issues.  She is currently being treated for depression.  She lives alone.  She is able to drive and perform ADLs/manage finances independently.  Hearing is adequate.  She has a living will and preventive services handout and safety handout were given to her.  Current doctor list updated.  RTC 3 months.     MIPS PHQ-9 Depression Screening: Completed form scanned and in chart; Total Score 14 Positive Depression Screen: Stable on medications. No suicidal ideation.  Negative alcohol screen     FOLLOW-UP: Schedule a follow-up visit in 3 months..  f/u DM with Yesenia           Orders:         Annual wellness visit, includes a PPPS, subsequent visit  (In-House)           Depression screen positive and follow up plan documented  (In-House)           Negative EtOH screen  (In-House)         3014F  Screening mammography results documented and reviewed (PV)1  (In-House)         2022F  Dilated retinal eye exam w/interpret by ophthalmologist/optometrist documented/reviewed (DM)4  (In-House)         APPTO  Appointment need  (In-House)         1100F  " Pt screen for fall risk; document 2+ falls in the past yr or any fall w/injury in past year (MICHAEL)  (In-House)            Type 2 diabetes     LABORATORY:  Labs ordered to be performed today include Diabetes Panel 1; CMP, Lipid, A1C and Microalbumin.            Orders:       44684  DIAB1 - [facility] LIPID,CMP, A1C: 35148, 27179, 80774  (Send-Out)         00908  CHRIS [facility] Microablbumin, quantitative  (Send-Out)            B12 deficiency     LABORATORY:  Labs ordered to be performed today include B12.            Orders:       01498  VB12 - [facility] Vitamin B12  (Send-Out)            Osteoporosis, other           Prescriptions:       Alendronate Sodium 70mg Tablet once a week  #12 (Twelve) tablet(s) Refills: 3          Hypertension She says she does not have any amlodipine at home -- \"K[facility] lost my prescription.\"  Sent in today.           Prescriptions:       Refill of: Amlodipine  5mg Tablet 1 tab daily  #90 (Ninety) tablet(s) Refills: 1          Hyperlipidemia She says she cannot afford the Crestor.  Will look into patient assistance.          Vaccination against pneumococcal pneumonia         IMMUNIZATIONS given today: Pneumovax.            Orders:       67966  Pneumococcal polysaccharide vaccine, 23-valent, adult or immunosuppressed patient dosage, for use in  (In-House)                     Administration of pneumococcal vaccine (x1)             Patient Recommendations:        For  Health checkup:     Schedule a follow-up visit in 3 months.                APPOINTMENT INFORMATION:        Monday Tuesday Wednesday Thursday Friday Saturday Sunday            Time:___________________AM  PM   Date:_____________________             CHARGE CAPTURE           **Please note: ICD descriptions below are intended for billing purposes only and may not represent clinical diagnoses**        Primary Diagnosis:         V70.0 Health checkup            Z00.00    Encounter for general adult medical examination without abnormal " findings              Orders:             Annual wellness visit, includes a PPPS, subsequent visit  (In-House)                Depression screen positive and follow up plan documented  (In-House)                Negative EtOH screen  (In-House)             3014F   Screening mammography results documented and reviewed (PV)1  (In-House)             2022F   Dilated retinal eye exam w/interpret by ophthalmologist/optometrist documented/reviewed (DM)4  (In-House)             APPTO   Appointment need  (In-House)             1100F   Pt screen for fall risk; document 2+ falls in the past yr or any fall w/injury in past year (MICHAEL)  (In-House)           V79.0 Screening for depression            Z13.89    Encounter for screening for other disorder    250.00 Type 2 diabetes            E11.9    Type 2 diabetes mellitus without complications    266.2 B12 deficiency            D51.0    Vitamin B12 defic anemia due to intrinsic factor deficiency    733.09 Osteoporosis, other            M81.8    Other osteoporosis without current pathological fracture    401.1 Hypertension            I10    Essential (primary) hypertension    272.4 Hyperlipidemia            E78.2    Mixed hyperlipidemia    585.3 Chronic kidney disease, Stage III (moderate)            N18.3    Chronic kidney disease, stage 3 (moderate)    V03.82 Vaccination against pneumococcal pneumonia            Z23    Encounter for immunization              Orders:          95750   Pneumococcal polysaccharide vaccine, 23-valent, adult or immunosuppressed patient dosage, for use in  (In-House)                                           Administration of pneumococcal vaccine (x1)

## 2021-05-18 NOTE — PROGRESS NOTES
Denisse Malave 1945     Office/Outpatient Visit    Visit Date: Wed, Mar 13, 2019 11:24 am    Provider: Geeta Patterson MD (Assistant: Rosalia Rome LPN)    Location: [facility]        Electronically signed by Geeta Patterson MD on  03/13/2019 12:47:22 PM                             SUBJECTIVE:        CC:     Ms. Malave is a 73 year old White female.  She presents with fell at home on 3/8/2019 hurt left arm. [facility] [place] pt states she falls everyday..          HPI: Denisse is here today after a fall at home x2 on 3/8/19.  She went to [facility] that [facility] and they checked her out and discharged her.  [facility]  She hit L arm as she went down, then rolled over.  No head trauma.   Today, her entire left arm is sore, worst in the should, upper arm, and elbow but also in the L hand.  She has some bruising of the upper arm and L 4th finger.  ROM limited due to pain.  The falls have been an ongoing issue for her.  She goes to see Dr. Christianson later this afternoon for evaluation of this.  She was seen here last week and at that time, [facility] to [facility] was [facility].  Her first appt was supposed to be tomorrow, but she has pushed that back as she is having some elbow pain from her falls.  Now she is thinking that she would like to try being seen tomorrow after all.        Her sister who accompanies her today wonders if Denisse would qualify for a Sonya Freestyle glucometer.  Denisse is not very consistent about checking her BGs and her sister thinks this would help improve her compliance.     ROS:     CONSTITUTIONAL:  Negative for fatigue and fever.      EYES:  Negative for blurred vision.      E/N/T:  Negative for diminished hearing and nasal congestion.      CARDIOVASCULAR:  Negative for chest pain and palpitations.      RESPIRATORY:  Negative for recent cough and dyspnea.      GASTROINTESTINAL:  Negative for abdominal pain, constipation, diarrhea, nausea and  "vomiting.      MUSCULOSKELETAL:  Positive for limb pain ( left upper extremity pain ).   Negative for arthralgias or myalgias.      NEUROLOGICAL:  Positive for vertigo and falls.   Negative for paresthesias or weakness.          PMH/FMH/SH:     Last Reviewed on 3/13/2019 11:38 AM by Geeta Patterson    Past Medical History:             PREVENTIVE HEALTH MAINTENANCE             BONE DENSITY: was last done 8/15/18 with the following abnormality noted-- osteoporosis     EYE EXAM: Diabetic Eye Exam during this calendar year and results are in chart was last done 2018     Hepatitis C Medicare Screening: was last done 7/10/18     MAMMOGRAM: Done within last 2 years and results in are chart was last done 8/15/2018 with normal results     PAP SMEAR: No longer indicated due to age and history s/p hysterectomy         PAST MEDICAL HISTORY         Positive for    Hyperlipidemia and    Hypertension;     Positive for    Chronic Renal Failure and    Urinary Incontinence;     Positive for    Type 2 Diabetes: complications include nephropathy and peripheral neuropathy; ;     Positive for    Cerebrovascular Accident;     Positive for    Pernicious Anemia;         CURRENT MEDICAL PROVIDERS:    Ophthalmologist: Dr Shelby         PAST MEDICAL HISTORY             ADVANCE DIRECTIVE Durable Power of   has copy         Surgical History:         Biopsy of breast; benign    Cholecystectomy    Hysterectomy: d/t concern for uterine cancer but this was benign;      rotator cuff repair R shoulder;    \"ear surgery\";         Family History:     Father: Coronary Artery Disease     Mother: Lung Cancer     Brother(s): Coronary Artery Disease     Sister(s): Neurological/Genetic Myasthenia Gravis     Son(s): Coronary Artery Disease         Social History:     Occupation:    Retired     Marital Status:      Children: 2 children 1 , 1 son Francisco Javier         Tobacco/Alcohol/Supplements:     Last Reviewed on 3/13/2019 11:38 AM by " Geeta Patterson    Tobacco: She has never smoked.          Substance Abuse History:     Last Reviewed on 3/13/2019 11:38 AM by Geeta Patterson        Mental Health History:     Last Reviewed on 3/13/2019 11:38 AM by Geeta Patterson        Communicable Diseases (eg STDs):     Last Reviewed on 3/13/2019 11:38 AM by Geeta Patterson            Current Problems:     Last Reviewed on 3/05/2019 01:07 PM by Geeta Patterson    Vertigo     Osteoporosis, other     Postmenopausal osteoporosis     Fall NOS     Hyperlipidemia     Hypertension     Type 2 diabetes     B12 deficiency     Chronic kidney disease, Stage III (moderate)     Personal history of transient ischemic attack (TIA), and cerebral infarction without residual deficits     Cellulitis of the leg     Other specified administrative purpose         Immunizations:     Prevnar 13 (Pneumococcal PCV 13) 7/18/2018         Allergies:     Last Reviewed on 3/05/2019 01:07 PM by Geeta Patterson      No Known Drug Allergies.         Current Medications:     Last Reviewed on 3/05/2019 01:07 PM by Geeta Patterson    Effexor XR 75mg Capsules, Extended Release Take 3 capsule(s) by mouth daily     Crestor 10mg Tablet 1 tab daily     Oxybutynin Chloride 5mg Tablet 1 tab daily     Losartan 100mg Tablet Take 1 tablet(s) by mouth daily     Accu-Chek Mary Plus Test Strips  Reagent Strips Test Blood Sugar qid DX E11.9     [place] (general)  Needle 31g 5mm pen needles, use as directed     Metformin HCl 1,000mg Tablet 1 tab daily     Amlodipine  5mg Tablet 1 tab daily     Basaglar KwikPen 100units/1ml Injection 75 units SC QAM (Patient Assistance)     Humalog KwikPen 100units/1ml Pen System, Disposable Use as directed per sliding scale     Metoprolol Succinate 50mg Tablets, Extended Release one tablet po q hs         OBJECTIVE:        Vitals:         Current: 3/13/2019 11:34:08 AM    Ht:  5 ft, 3.5 in;  Wt: 172.8 lbs;  BMI: 30.1    T: 98 F (oral);  BP: 152/70 mm Hg (right arm, sitting);  P:  65 bpm (right arm (BP Cuff), sitting);  sCr: 1.03 mg/dL;  GFR: 52.07        Exams:     PHYSICAL EXAM:     GENERAL: vital signs recorded - well developed, well nourished;  well groomed;  no apparent distress;     EYES: extraocular movements intact; conjunctiva and cornea are normal; PERRLA;     RESPIRATORY: normal respiratory rate and pattern with no distress; normal breath sounds with no rales, rhonchi, wheezes or rubs;     CARDIOVASCULAR: normal rate; rhythm is regular;  no systolic murmur; no edema;     MUSCULOSKELETAL: normal gait; decreased range of motion noted in: left shoulder flexion, extension, abduction, internal rotation, and external rotation;  left elbow flexion and extension;  pain with range of motion in: L  with pain;  normal overall tone L arm -- diffusely TTP over L shouulder, upper arm and elbow.  Ecchymosis over the medial upper arm.;         ASSESSMENT           E888.9   W19.XXXA  Fall NOS              DDx:     729.5   M79.602  Arm pain              DDx:     250.00   E11.9  Type 2 diabetes              DDx:         ORDERS:         Radiology/Test Orders:       [id number]  Left radiologic examination, elbow; complete, minimum of three views  (Send-Out)         [id number]  Left radiologic examination; hand, minimum of three views  (Send-Out)         [id number]  Left radiologic examination; humerus, minimum of two views  (Send-Out)         [id number]  Left Radiologic exam, shoulder; comp, 2 views  (Send-Out)         3014F  Screening mammography results documented and reviewed (PV)1  (In-House)         2022F  Dilated retinal eye exam w/interpret by ophthalmologist/optometrist documented/reviewed (DM)4  (In-House)           Other Orders:         Calculated BMI above the upper parameter and a follow-up plan was documented in the medical record  (In-House)                   PLAN:          Fall NOS Denisse has had several more falls this week, unfortunately.  She goes to see Dr. Christianson this afternoon for  further evaluation of the vertigo and falls.  She has a lot of pain and bruising in the L arm, so I am going to get XRs of this as below.  She already has a referral placed to PT, so she is going to be getting started on that in the near future.  Her sister wonders if she would benefit from a wheelchair.  PT can do that evaluation for us and get back with me so we can send an order in if necessary.     MIPS Vaccines Flu and Pneumonia updated in Shot record    DIABETIC EYE EXAM: Diabetic Eye Exam during this calendar year and results are in chart     MAMMOGRAM: Done within last 2 years and results in are chart     BMI Elevated - Follow-Up Plan: She was provided education on weight loss strategies     FOLLOW-UP: Keep currently scheduled appointment..            Orders:       3014F  Screening mammography results documented and reviewed (PV)1  (In-House)           Calculated BMI above the upper parameter and a follow-up plan was documented in the medical record  (In-House)         2022F  Dilated retinal eye exam w/interpret by ophthalmologist/optometrist documented/reviewed (DM)4  (In-House)             Patient Education Handouts:       [facility] Medication Compliance           Arm pain As above.         RADIOLOGY:  I have ordered a left elbow x-ray, a Left hand hand x-ray, a Left Humerous x-ray, and Shoulder x-ray: left shoulder to be done today.            Orders:       [id number]  Left radiologic examination, elbow; complete, minimum of three views  (Send-Out)         [id number]  Left radiologic examination; hand, minimum of three views  (Send-Out)         [id number]  Left radiologic examination; humerus, minimum of two views  (Send-Out)         [id number]  Left Radiologic exam, shoulder; comp, 2 views  (Send-Out)            Type 2 diabetes I am not sure she would qualify for a Sonya FreeStyle glucometer, but it would be great if we could get her more compliant with her blood sugar monitoring.  Order sent to [facility] Sharon Stinson to  get that started.  Denisse is on Basaglar once daily with Humalog sliding scale insulin TID.             Patient Recommendations:        For  Fall NOS:                     APPOINTMENT INFORMATION:        Monday Tuesday Wednesday Thursday Friday Saturday Sunday            Time:___________________AM  PM   Date:_____________________         For  Arm pain:     left elbow x-ray             CHARGE CAPTURE           **Please note: ICD descriptions below are intended for billing purposes only and may not represent clinical diagnoses**        Primary Diagnosis:         E888.9 Fall NOS            W19.XXXA    Unspecified fall, initial encounter              Orders:          [id number]   Office/outpatient visit; established patient, level 4  (In-House)             3014F   Screening mammography results documented and reviewed (PV)1  (In-House)                Calculated BMI above the upper parameter and a follow-up plan was documented in the medical record  (In-House)             2022F   Dilated retinal eye exam w/interpret by ophthalmologist/optometrist documented/reviewed (DM)4  (In-House)           729.5 Arm pain            M79.602    Pain in left arm    250.00 Type 2 diabetes            E11.9    Type 2 diabetes mellitus without complications

## 2021-05-18 NOTE — PROGRESS NOTES
Denisse Malave 1945     Office/Outpatient Visit    Visit Date: Tue, Mar 5, 2019 12:49 pm    Provider: Geeta Patterson MD (Assistant: Rosa Argulelo MA)    Location: [facility]        Electronically signed by Geeta Patterson MD on  03/05/2019 09:48:08 PM                             SUBJECTIVE:        CC:     Ms. Malave is a 73 year old White female.  This is a follow-up visit.  Patient presents today for follow up from [facility] ER 03/01/2019 - frequent falling. (not taking Amlodipine);         HPI: Denisse is here today to follow up after being seen in the [facility] ED on 3-1-19 for falls.  She has a longstanding h/o dizzy spells.  She has tried PT in the past with no improvement.  She does have hearing problems. 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 She has had many ear surgeries because she has a history of recurrent ear infections.  She describes the dizziness as vertigo.  She was diagnosed in the ED with orthostatic hypotension.  She is on losartan and amlodipine but was instructed to hold the amlodipine.  She has seen other ENTs in the past without getting an etiology for her dizziness.  She has an appt with Dr. Christianson on 3-13-19.         She has been seen previously by Dr. Munroe and cardiac workup did not show any reason for her dizziness.     ROS:     CONSTITUTIONAL:  Negative for fatigue and fever.      EYES:  Negative for blurred vision.      E/N/T:  Negative for diminished hearing and nasal congestion.      CARDIOVASCULAR:  Negative for chest pain and palpitations.      RESPIRATORY:  Negative for recent cough and dyspnea.      GASTROINTESTINAL:  Negative for abdominal pain, constipation, diarrhea, nausea and vomiting.      MUSCULOSKELETAL:  Negative for arthralgias and myalgias.      NEUROLOGICAL:  Positive for vertigo and falls.   Negative for paresthesias or weakness.          PMH/FMH/SH:     Last Reviewed on 3/05/2019 01:07 PM by Geeta Patterson    [facility]  "History:             PREVENTIVE HEALTH MAINTENANCE             BONE DENSITY: was last done 8/15/18 with the following abnormality noted-- osteoporosis     EYE EXAM: Diabetic Eye Exam during this calendar year and results are in chart was last done 2018     Hepatitis C Medicare Screening: was last done 7/10/18     MAMMOGRAM: Done within last 2 years and results in are chart was last done 8/15/2018 with normal results     PAP SMEAR: No longer indicated due to age and history s/p hysterectomy         PAST MEDICAL HISTORY         Positive for    Hyperlipidemia and    Hypertension;     Positive for    Chronic Renal Failure and    Urinary Incontinence;     Positive for    Type 2 Diabetes: complications include nephropathy and peripheral neuropathy; ;     Positive for    Cerebrovascular Accident;     Positive for    Pernicious Anemia;         CURRENT MEDICAL PROVIDERS:    Ophthalmologist: Dr Shelby         PAST MEDICAL HISTORY             ADVANCE DIRECTIVE Durable Power of   has copy         Surgical History:         Biopsy of breast; benign    Cholecystectomy    Hysterectomy: d/t concern for uterine cancer but this was benign;      rotator cuff repair R shoulder;    \"ear surgery\";         Family History:     Father: Coronary Artery Disease     Mother: Lung Cancer     Brother(s): Coronary Artery Disease     Sister(s): Neurological/Genetic Myasthenia Gravis     Son(s): Coronary Artery Disease         Social History:     Occupation:    Retired     Marital Status:      Children: 2 children 1 , 1 son Francisco Javier         Tobacco/Alcohol/Supplements:     Last Reviewed on 3/05/2019 01:07 PM by Geeta Patterson    Tobacco: She has never smoked.          Substance Abuse History:     Last Reviewed on 3/05/2019 01:07 PM by Geeta Patterson        Mental Health History:     Last Reviewed on 3/05/2019 01:07 PM by Geeta Patterson        Communicable Diseases (eg STDs):     Last Reviewed on 3/05/2019 01:07 PM by " Geeta Patterson            Current Problems:     Last Reviewed on 2/04/2019 01:27 PM by Lety Jansen    Vertigo     Osteoporosis, other     Postmenopausal osteoporosis     Fall NOS     Hyperlipidemia     Hypertension     Type 2 diabetes     B12 deficiency     Chronic kidney disease, Stage III (moderate)     Personal history of transient ischemic attack (TIA), and cerebral infarction without residual deficits     Cellulitis of the leg     Leg swelling     Other specified administrative purpose         Immunizations:     Prevnar 13 (Pneumococcal PCV 13) 7/18/2018         Allergies:     Last Reviewed on 2/04/2019 01:27 PM by Lety Jansen      No Known Drug Allergies.         Current Medications:     Last Reviewed on 2/04/2019 01:27 PM by Lety Jansen    Effexor XR 75mg Capsules, Extended Release Take 3 capsule(s) by mouth daily     Crestor 10mg Tablet 1 tab daily     Oxybutynin Chloride 5mg Tablet 1 tab daily     Amlodipine  5mg Tablet 1 tab daily     Losartan 100mg Tablet Take 1 tablet(s) by mouth daily     Accu-Chek Mary Plus Test Strips  Reagent Strips Test Blood Sugar qid DX E11.9     [place] (general)  Needle 31g 5mm pen needles, use as directed     Metformin HCl 1,000mg Tablet 1 tab daily     Basaglar KwikPen 100units/1ml Injection 75 units SC QAM (Patient Assistance)     Humalog KwikPen 100units/1ml Pen System, Disposable Use as directed per sliding scale     Metoprolol Succinate 50mg Tablets, Extended Release one tablet po q hs     Mupirocin 2% Topical Ointment Apply to affected area tid x 10 days     Doxycycline  100mg Capsules 1 cap po bid         OBJECTIVE:        Vitals:         Current: 3/5/2019 12:55:11 PM    Ht:  5 ft, 3.5 in;  Wt: 175.2 lbs;  BMI: 30.5    T: 98.2 F (oral);  BP: 182/65 mm Hg (left arm, sitting);  P: 62 bpm (left arm (BP Cuff), sitting);  sCr: 1.03 mg/dL;  GFR: 52.37        Repeat:     1:37:58 PM     BP:   155/63mm Hg (left arm, sitting)         Exams:     PHYSICAL EXAM:      GENERAL: vital signs recorded - well developed, well nourished;  well groomed;  no apparent distress;     EYES: extraocular movements intact; conjunctiva and cornea are normal; PERRLA;     E/N/T: OROPHARYNX:  normal mucosa, dentition, gingiva, and posterior pharynx;     RESPIRATORY: normal respiratory rate and pattern with no distress; normal breath sounds with no rales, rhonchi, wheezes or rubs;     CARDIOVASCULAR: normal rate; rhythm is regular;  no systolic murmur; no edema;     GASTROINTESTINAL: nontender; normal bowel sounds;     MUSCULOSKELETAL: normal gait; normal overall tone     NEUROLOGIC: mental status: alert and oriented x 3; Reflexes: brachioradialis: 2+; knee jerks: 2+;         ASSESSMENT           780.4   H81.12  Vertigo              DDx:     E888.9   W19.XXXA  Fall NOS              DDx:     401.1   I10  Hypertension              DDx:         ORDERS:         Radiology/Test Orders:       3014F  Screening mammography results documented and reviewed (PV)1  (In-House)         2022F  Dilated retinal eye exam w/interpret by ophthalmologist/optometrist documented/reviewed (DM)4  (In-House)           Procedures Ordered:       REFER  Referral to Specialist or Other Facility  (Send-Out)                   PLAN:          Vertigo Ongoing issues with vertigo.  ED physician raised possibility of orthostatic hypotension, but Denisse has been evaluated by Cardiology with no abnormalities found.  Her BP is quite elevated today, so I am going to start her back on amlodipine.  She does have an appt scheduled with Dr. Christianson ENT for evaluation of her vertigo to be done 3-14-19.  We will see what he can find, if anything.  If ENT workup is negative, I would plan to get MRI brain and consider Neuro referral.  CT head done in the ED was unremarkable.  She would like to get back in for PT with [facility], which she did last year.  That did help with her general strength.  I think this is a great idea and will place  that referral today.  She should keep her appt with me in April or RTC sooner prn.     MIPS     MAMMOGRAM: Done within last 2 years and results in are chart           Orders:       3014F  Screening mammography results documented and reviewed (PV)1  (In-House)         2022F  Dilated retinal eye exam w/interpret by ophthalmologist/optometrist documented/reviewed (DM)4  (In-House)             Patient Education Handouts:       [facility] [place]           Fall NOS As above.         REFERRALS:  Referral initiated to physical therapy ( at [facility]; for evaluation of strengthening, recurrent falls ).            Orders:       REFER  Referral to Specialist or Other Facility  (Send-Out)            Hypertension As above.             CHARGE CAPTURE           **Please note: ICD descriptions below are intended for billing purposes only and may not represent clinical diagnoses**        Primary Diagnosis:         780.4 Vertigo            H81.12    Benign paroxysmal vertigo, left ear              Orders:          [id number]   Office/outpatient visit; established patient, level 4  (In-House)             3014F   Screening mammography results documented and reviewed (PV)1  (In-House)             2022F   Dilated retinal eye exam w/interpret by ophthalmologist/optometrist documented/reviewed (DM)4  (In-House)           E888.9 Fall NOS            W19.XXXA    Unspecified fall, initial encounter    401.1 Hypertension            I10    Essential (primary) hypertension        ADDENDUMS:      ____________________________________    Addendum: 03/07/2019 12:52 PM - Five, Team         Visit Note Faxed to:        User Entered Recipient; Number [phone]            Date: 03/08/2019 09:15 AM    Author: Sharon Stinson         Visit Note Faxed to:        Physical  (PTA), [facility] ([facility]); Number [phone]

## 2021-05-18 NOTE — PROGRESS NOTES
"Denisse Malave AXEL  1945     Office/Outpatient Visit    Visit Date: Wed, Apr 22, 2020 09:45 am    Provider: Geeta Patterson MD (Assistant: Laura Solorzano MA)    Location: [facility]        Electronically signed by Geeta Patterson MD on  04/22/2020 10:45:48 AM                             Subjective:        CC: Ms. Malave is a 74 year old White female.  This is a follow-up visit.  pt does not have bottle of alendronate        HPI: Denisse is here for routine f/u on chronic issues.        She is on venlafaxine XR 75 mg for depression.  That has been controlled pretty well.  Mood has been \"fine.\"          She is on metformin, Basaglar, and Humalog (which she uses just once daily in the morning) for DM.  She is now following with Ira France.  She is UTD on eye exam (last done 9/2019).  She is UTD on foot exam (4/2019).  She ended up going to the ED to get checked out.  She has been trying to work on adhering to her diabetic diet but this has been difficult for her.        She is on amlodipine and losartan for HTN.  BP has been well controlled when she checks it at home.  She has not taken her meds yet today.  No CP, palpitations, SOB.        She is on rosuvastatin for HLD and h/o stroke.  No myalgias or other adverse effects.    ROS:     CONSTITUTIONAL:  Negative for fatigue and fever.      EYES:  Negative for blurred vision.      E/N/T:  Positive for diminished hearing and nasal congestion.   Negative for frequent rhinorrhea.      CARDIOVASCULAR:  Negative for chest pain and palpitations.      RESPIRATORY:  Negative for recent cough and dyspnea.      GASTROINTESTINAL:  Negative for abdominal pain, constipation, diarrhea, nausea and vomiting.      MUSCULOSKELETAL:  Positive for arthralgias.   Negative for myalgias.      PSYCHIATRIC:  Negative for anxiety, depression and sleep disturbance.          Past Medical History / Family History / Social History:         Last Reviewed on 4/22/2020 10:05 AM by " "Geeta Patterson    Past Medical History:             PREVENTIVE HEALTH MAINTENANCE             BONE DENSITY: was last done 8/15/18 with the following abnormality noted-- osteoporosis     COLORECTAL CANCER SCREENING: declines colorectal cancer screening, understands reason for testing     EYE EXAM: Diabetic Eye Exam during this calendar year and results are in chart was last done 19 The next one is due  6 months mild/moderate diabetic retinopathy     Hepatitis C Medicare Screening: was last done 7/10/18     MAMMOGRAM: Done within last 2 years and results in are chart was last done 2019 with normal results     PAP SMEAR: No longer indicated due to age and history s/p hysterectomy         PAST MEDICAL HISTORY         Positive for    Hyperlipidemia and    Hypertension;     Positive for    Chronic Renal Failure and    Urinary Incontinence;     Positive for    Type 2 Diabetes: complications include nephropathy and peripheral neuropathy; ;     Positive for    Cerebrovascular Accident;     Positive for    Pernicious Anemia;         CURRENT MEDICAL PROVIDERS:    Cardiologist: Dr Munroe    Endocrinologist: Ira France NP    E/N/T: Dr Christianson    Ophthalmologist: Dr Shelby             ADVANCE DIRECTIVES: Durable Power of   has copy         Surgical History:         Biopsy of breast; benign    Cholecystectomy    Hysterectomy: d/t concern for uterine cancer but this was benign;     rotator cuff repair R shoulder;    \"ear surgery\";         Family History:     Father: Coronary Artery Disease     Mother: Lung Cancer     Brother(s): Coronary Artery Disease     Sister(s): Neurological/Genetic Myasthenia Gravis     Son(s): Coronary Artery Disease         Social History:     Occupation:    Retired     Marital Status:      Children: 2 children 1 , 1 son Francisco Javier         Tobacco/Alcohol/Supplements:     Last Reviewed on 2020 10:05 AM by Geeta Patterson    Tobacco: She has never smoked.          " Substance Abuse History:     Last Reviewed on 4/22/2020 10:05 AM by Geeta Patterson        Mental Health History:     Last Reviewed on 4/22/2020 10:05 AM by Geeta Patterson        Communicable Diseases (eg STDs):     Last Reviewed on 4/22/2020 10:05 AM by Geeta Patterson        Current Problems:     Last Reviewed on 4/17/2020 11:40 AM by Geeta Patterson    Vitamin B12 defic anemia due to intrinsic factor deficiency    CKD - Chronic kidney disease, stage 3 (moderate)    H/o stroke - Personal history of transient ischemic attack (TIA), and cerebral infarction without residual deficits    HLD - Mixed hyperlipidemia    HTN - Essential (primary) hypertension    Other osteoporosis without current pathological fracture    Benign paroxysmal vertigo, left ear    Anxiety disorder, unspecified    Type 2 diabetes mellitus with hyperglycemia    Repeated falls    Encounter for screening for depression    Muscle weakness (generalized)    Unspecified injury of right elbow, initial encounter    Age-related cognitive decline    Postconcussional syndrome    Unspecified injury of head, initial encounter    Intercostal pain    Encounter for other administrative examinations    Acute maxillary sinusitis, unspecified        Immunizations:     Prevnar 13 (Pneumococcal PCV 13) 7/18/2018    Pneumococcal, 23-valent IM/SC (adult and pt >=2yr) 10/31/2019        Allergies:     Last Reviewed on 4/22/2020 09:45 AM by Laura Solorzano    No Known Allergies.        Current Medications:     Last Reviewed on 4/22/2020 09:45 AM by Laura Solorzano    Amlodipine  5 mg oral tablet [1 tab daily]    Losartan 100 mg oral tablet [Take 1 tablet(s) by mouth daily]    metoprolol succinate 50 mg oral Tablet, Extended Release 24 hr [TAKE ONE TABLET BY MOUTH EVERY NIGHT AT BEDTIME]    oxybutynin chloride 5 mg oral tablet [TAKE ONE TABLET BY MOUTH DAILY]    Crestor 10mg Tablet [1 tab daily]    metFORMIN 1,000 mg oral tablet [1 tab daily]    Basaglar KwikPen U-100 Insulin  100 unit/mL (3 mL) subcutaneous Insulin Pen [75 units QAM (Patient Assistance)]    HumaLOG KwikPen Insulin 100 unit/mL subcutaneous Insulin Pen [10-15 units based on carbs at each meal (patient assistance)]    Needles (general)  Needle [31g 5mm pen needles, use as directed]    Accu-Chek Mary Plus Test Strips  Reagent Strips [Test Blood Sugar qid DX E11.9]    alendronate 70 mg oral tablet [once a week]    venlafaxine 75 mg oral tablet [TAKE THREE TABLETS BY MOUTH DAILY WITH FOOD]    Augmentin 875-125 mg oral tablet [take 1 tablet by oral route every 12 hours for 10 days]    guaiFENesin 1,200 mg oral Tablet,Extended Release 12 hr [1 tab PO BID prn cough/congestion]        Objective:        Vitals:         Current: 4/22/2020 9:58:12 AM    Ht:  5 ft, 3.5 in;  Wt: 170.2 lbs;  BMI: 29.7T: 98.6 F (oral);  BP: 177/87 mm Hg (left arm, sitting);  P: 68 bpm (left arm (BP Cuff), sitting);  sCr: 0.92 mg/dL;  GFR: 57.10        Repeat:     9:59:18 AM  BP:   179/78mm Hg (right arm, sitting, P-76)     Exams:     PHYSICAL EXAM:     GENERAL: vital signs recorded - well developed, well nourished;  well groomed;  no apparent distress;     EYES: extraocular movements intact; conjunctiva and cornea are normal; PERRLA;     E/N/T: OROPHARYNX:  normal mucosa, dentition, gingiva, and posterior pharynx;     RESPIRATORY: normal respiratory rate and pattern with no distress; normal breath sounds with no rales, rhonchi, wheezes or rubs;     CARDIOVASCULAR: normal rate; rhythm is regular;  no systolic murmur; no edema;     GASTROINTESTINAL: nontender; normal bowel sounds;     MUSCULOSKELETAL: normal gait; normal overall tone     PSYCHIATRIC: appropriate affect and demeanor; normal psychomotor function; normal speech pattern;         Assessment:         E11.65   Type 2 diabetes mellitus with hyperglycemia       I10   HTN - Essential (primary) hypertension       E78.2   HLD - Mixed hyperlipidemia       Z86.73   H/o stroke - Personal history of  "transient ischemic attack (TIA), and cerebral infarction without residual deficits       F41.9   Anxiety disorder, unspecified       N18.3   CKD - Chronic kidney disease, stage 3 (moderate)           ORDERS:         Radiology/Test Orders:       2022F  Dilated retinal eye exam w/interpret by ophthalmologist/optometrist documented/reviewed (DM)4  (In-House)              Lab Orders:       APPTO  Appointment need  (In-House)              Other Orders:       1124F  Advance Care Planning discussed and doc in MR; no surrogate named or advance care plan provided  (Send-Out)              Screening mammogram results documented  (Send-Out)                      Plan:         Type 2 diabetes mellitus with hyperglycemiaMarisol is on metformin, Basaglar, and Humalog (which she uses just once daily in the morning) for DM.  No refills needed.  She is now following with Ira France.  She is UTD on eye exam (last done 9/2019).  She is UTD on foot exam (4/2019).  She ended up going to the ED to get checked out.  She has been trying to work on adhering to her diabetic diet but this has been difficult for her.  See below paragraph: for these reasons, I am not going to check an A1c on her today.        Of note -- Denisse is currently being treated for a presumed sinusitis via telehealth visit by me last week.  She has been reporting no fever, cough or SOB.  However, in the course of her visit today, she did mention that her sister recently tested positive for COVID-19 at the hospital and was subsequently sent home to finish her quarantine.  Denisse reports that she observed the quarantine period \"for 14 or 21 days, whatever it was\" but that she has been around her sister since then.  I am not sure we can rely on her history that she observed the quarantine period appropriately.  She is wearing a cloth mask today pulled down below her nose.  We are going to call her to check on her QOD through the next week to make sure she does not " develop COVID-like sx.    MIPS Vaccines Flu and Pneumonia updated in Shot record Screening mammomgram done within last 2 years and results in are chart Diabetic Eye Exam during this calendar year and results are in chart Advance Directive/Surrogate Decision Maker discussed and pt declines to complete today     FOLLOW-UP: Schedule a follow-up visit in 3 months.:.  Medicare wellness 30 min with [place]          Orders:       2022F  Dilated retinal eye exam w/interpret by ophthalmologist/optometrist documented/reviewed (DM)4  (In-House)            1124F  Advance Care Planning discussed and doc in MR; no surrogate named or advance care plan provided  (Send-Out)            APPTO  Appointment need  (In-House)              Screening mammogram results documented  (Send-Out)              HTN - Essential (primary) hypertensionContinue to monitor BP at home.  She has not taken her meds yet today.  No refills needed.        HLD - Mixed hyperlipidemiaNo refills needed.        H/o stroke - Personal history of transient ischemic attack (TIA), and cerebral infarction without residual deficitsStable.          Anxiety disorder, unspecifiedStable.  No refills needed.        CKD - Chronic kidney disease, stage 3 (moderate)Stable.              Patient Recommendations:        For  Type 2 diabetes mellitus with hyperglycemia:    Schedule a follow-up visit in 3 months.                APPOINTMENT INFORMATION:        Monday Tuesday Wednesday Thursday Friday Saturday Sunday            Time:___________________AM  PM   Date:_____________________             Charge Capture:         Primary Diagnosis:     E11.65  Type 2 diabetes mellitus with hyperglycemia           Orders:      [id number]  Office/outpatient visit; established patient, level 4  (In-House)            2022F  Dilated retinal eye exam w/interpret by ophthalmologist/optometrist documented/reviewed (DM)4  (In-House)            APPTO  Appointment need  (In-House)              I10   HTN - Essential (primary) hypertension     E78.2  HLD - Mixed hyperlipidemia     Z86.73  H/o stroke - Personal history of transient ischemic attack (TIA), and cerebral infarction without residual deficits     F41.9  Anxiety disorder, unspecified     N18.3  CKD - Chronic kidney disease, stage 3 (moderate)

## 2021-05-18 NOTE — PROGRESS NOTES
Denisse Malave 1945     Office/Outpatient Visit    Visit Date: Thu, Oct 31, 2019 08:53 am    Provider: Geeta Patterson MD (Assistant: Rosa Arguello MA)    Location: [facility]        Electronically signed by Geeta Patterson MD on  10/31/2019 12:33:34 PM                             SUBJECTIVE:        CC:     Ms. Malave is a 74 year old White female.  Patient presents today for three month follow up;         HPI: Denisse is here to follow up on chronic issues.        Dizziness has improved.  She was seen in clinic last week and treated for otitis externa with Ciprodex drops after getting a new hearing aid.  She is feeling great now.  No more ear pain.        She is on metformin, Basaglar, and Humalog (which she uses just once daily in the morning) for DM.  Last A1c 10.5 in July.  She is due for eye exam (last done 9/2018).  She is UTD on foot exam (4/2019).  She is on an ASA, ARB and statin.        She is on amlodipine and losartan for HTN.  BP has been well controlled when she checks it at home.  No CP, palpitations, SOB.        She is on rosuvastatin for HLD.  No myalgias or other adverse effects.        She is on Effexor XR for depression. Sx well controlled.  No complaint of depression or anhedonia.  No anxiety or panic attacks.     ROS:     CONSTITUTIONAL:  Negative for fatigue and fever.      EYES:  Negative for blurred vision.      E/N/T:  Positive for diminished hearing and nasal congestion.   Negative for frequent rhinorrhea.      CARDIOVASCULAR:  Negative for chest pain and palpitations.      RESPIRATORY:  Negative for recent cough and dyspnea.      GASTROINTESTINAL:  Negative for abdominal pain, constipation, diarrhea, nausea and vomiting.      MUSCULOSKELETAL:  Positive for arthralgias.   Negative for myalgias.      PSYCHIATRIC:  Negative for anxiety, depression and sleep disturbance.          PMH/FMH/SH:     Last Reviewed on 10/31/2019 09:02 AM by Geeta Patterson    [facility]  "History:             PREVENTIVE HEALTH MAINTENANCE             BONE DENSITY: was last done 8/15/18 with the following abnormality noted-- osteoporosis     COLORECTAL CANCER SCREENING: declines colorectal cancer screening, understands reason for testing     EYE EXAM: Diabetic Eye Exam during this calendar year and results are in chart was last done 2018     Hepatitis C Medicare Screening: was last done 7/10/18     MAMMOGRAM: Done within last 2 years and results in are chart was last done 2019 with normal results     PAP SMEAR: No longer indicated due to age and history s/p hysterectomy         PAST MEDICAL HISTORY         Positive for    Hyperlipidemia and    Hypertension;     Positive for    Chronic Renal Failure and    Urinary Incontinence;     Positive for    Type 2 Diabetes: complications include nephropathy and peripheral neuropathy; ;     Positive for    Cerebrovascular Accident;     Positive for    Pernicious Anemia;         CURRENT MEDICAL PROVIDERS:    Cardiologist: Dr Munroe    E/N/T: Dr Christianson    Ophthalmologist: Dr Shelby             ADVANCE DIRECTIVES: Durable Power of   has copy         Surgical History:         Biopsy of breast; benign    Cholecystectomy    Hysterectomy: d/t concern for uterine cancer but this was benign;      rotator cuff repair R shoulder;    \"ear surgery\";         Family History:     Father: Coronary Artery Disease     Mother: Lung Cancer     Brother(s): Coronary Artery Disease     Sister(s): Neurological/Genetic Myasthenia Gravis     Son(s): Coronary Artery Disease         Social History:     Occupation:    Retired     Marital Status:      Children: 2 children 1 , 1 son Francisco Javier         Tobacco/Alcohol/Supplements:     Last Reviewed on 10/31/2019 09:02 AM by Geeta Patterson    Tobacco: She has never smoked.          Substance Abuse History:     Last Reviewed on 10/31/2019 09:02 AM by Geeta Patterson        Mental Health History:     Last " Reviewed on 10/31/2019 09:02 AM by Geeta Patterson        Communicable Diseases (eg STDs):     Last Reviewed on 10/31/2019 09:02 AM by Geeta Patterson            Current Problems:     Last Reviewed on 10/23/2019 03:22 PM by Geeta Patterson    Anxiety     Vertigo     Osteoporosis, other     Fall NOS     Hyperlipidemia     Hypertension     Type 2 diabetes     B12 deficiency     Chronic kidney disease, Stage III (moderate)     Personal history of transient ischemic attack (TIA), and cerebral infarction 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         Current Medications:     Last Reviewed on 10/23/2019 03:22 PM by Geeta Patterson    Metoprolol Succinate 50mg Tablets, Extended Release one tablet po q hs     Oxybutynin Chloride 5mg Tablet 1 tab daily     Crestor 10mg Tablet 1 tab daily     Losartan 100mg Tablet Take 1 tablet(s) by mouth daily     Metformin HCl 1,000mg Tablet 1 tab daily     Venlafaxine HCl 225mg Tablets, Extended Release Take 1 tablet(s) by mouth daily     Alendronate Sodium 70mg Tablet once a week     Amlodipine  5mg Tablet 1 tab daily     Accu-Chek Mary Plus Test Strips  Reagent Strips Test Blood Sugar qid DX E11.9     [place] (general)  Needle 31g 5mm pen needles, use as directed     Basaglar KwikPen 100units/1ml Injection 75 units QAM and 30 units QPM (Patient Assistance)     Humalog KwikPen 100units/1ml Pen System, Disposable Use as directed per sliding scale     Ciprodex 0.3%/0.1% Otic Suspension 3 to 4 gtts tid  to right ear X 1 week         OBJECTIVE:        Vitals:         Current: 10/31/2019 8:59:10 AM    Ht:  5 ft, 3.5 in;  Wt: 171.4 lbs;  BMI: 29.9    T: 97.7 F (oral);  BP: 156/91 mm Hg (left arm, sitting);  P: 63 bpm (left arm (BP Cuff), sitting);  sCr: 0.86 mg/dL;  GFR: 61.26        Exams:     PHYSICAL  EXAM:     GENERAL: vital signs recorded - well developed, well nourished;  well groomed;  no apparent distress;     EYES: extraocular movements intact; conjunctiva and cornea are normal; PERRLA;     E/N/T: OROPHARYNX:  normal mucosa, dentition, gingiva, and posterior pharynx;     RESPIRATORY: normal respiratory rate and pattern with no distress; normal breath sounds with no rales, rhonchi, wheezes or rubs;     CARDIOVASCULAR: normal rate; rhythm is regular;  no systolic murmur; no edema;     GASTROINTESTINAL: nontender; normal bowel sounds;     MUSCULOSKELETAL: normal gait; normal overall tone     PSYCHIATRIC: appropriate affect and demeanor; normal psychomotor function; normal speech pattern;         Lab/Test Results:             Hemoglobin A1c:  10.1 (10/31/2019),     Performed by::  tls (10/31/2019),             Procedures:     Vaccination against pneumococcal pneumonia     1. Pneumovax (pneumococcal PPSV23): 0.5 ml unit dose given IM in the left upper arm; administered by AS;  lot number [id number]; expires 03/25/2021             ASSESSMENT           250.00   E11.9  Type 2 diabetes              DDx:     401.1   I10  Hypertension              DDx:     272.4   E78.2  Hyperlipidemia              DDx:     V12.54   Z86.73  Personal history of transient ischemic attack (TIA), and cerebral infarction without residual deficits              DDx:     585.3   N18.3  Chronic kidney disease, Stage III (moderate)              DDx:     733.09   M81.8  Osteoporosis, other              DDx:     300.02   F41.9  Anxiety              DDx:     V03.82   Z23  Vaccination against pneumococcal pneumonia              DDx:         ORDERS:         Radiology/Test Orders:       3014F  Screening mammography results documented and reviewed (PV)1  (In-House)           Lab Orders:       APPTO  Appointment need  (In-House)         53098*  Hgb A1c fast lab  (In-House)           Procedures Ordered:       REFER  Referral to Specialist or Other Facility   (Send-Out)         91671  Pneumococcal polysaccharide vaccine, 23-valent, adult or immunosuppressed patient dosage, for use in  (In-House)         39468  Collection of capillary blood specimen (eg, finger, heel, ear stick)  (In-House)           Other Orders:         Administration of pneumococcal vaccine (x1)                 PLAN:          Type 2 diabetes She is on metformin, Basaglar, and Humalog (which she uses just once daily in the morning) for DM.  Last A1c 10.5 in July.  Checking A1c today.  She is on over 100 units Basaglar daily.  Discussed importance of adhering to diabetic diet.  I would like to get her in with Ira France to see if she would qualify for insulin pump.  She is reportedly UTD on eye exam (although the last one in our record was done 9/2018 and I suspect she has not really had one since then); requesting records from Dr. Shelby and we will get her scheduled if she is actually due.  She is UTD on foot exam (4/2019).  She is on an ASA, ARB and statin.  RTC 3 months.        LABORATORY:  Labs ordered to be performed today include Hgb A1c inhouse fast lab.      REFERRALS:  Referral initiated to Ira France with [facility] for DM, insulin pump evaluation.  MIPS Vaccines Flu and Pneumonia updated in Shot record Screening mammomgram done within last 2 years and results in are chart     FOLLOW-UP: Schedule a follow-up visit in 3 months.:.  f/u DM with [facility]           Orders:       3014F  Screening mammography results documented and reviewed (PV)1  (In-House)         APPTO  Appointment need  (In-House)         93633*  Hgb A1c fast lab  (In-House)         REFER  Referral to Specialist or Other Facility  (Send-Out)         01987  Collection of capillary blood specimen (eg, finger, heel, ear stick)  (In-House)            Hypertension BP at goal at home.  No refills needed.  Labs reviewed and UTD.          Hyperlipidemia Stable.  No refills needed.  Labs reviewed and UTD.          Personal history of  transient ischemic attack (TIA), and cerebral infarction without residual deficits Stable.  No refills needed.  Labs reviewed and UTD.          Chronic kidney disease, Stage III (moderate) Continue to monitor.          Osteoporosis, other Stable.  No refills needed.  DEXA UTD.          Anxiety Stable.  No refills needed.          Vaccination against pneumococcal pneumonia         IMMUNIZATIONS given today: Pneumovax.            Orders:       21863  Pneumococcal polysaccharide vaccine, 23-valent, adult or immunosuppressed patient dosage, for use in  (In-House)                     Administration of pneumococcal vaccine (x1)             Patient Recommendations:        For  Type 2 diabetes:     I also recommend Ira France with [facility] for DM, insulin pump evaluation.  Schedule a follow-up visit in 3 months.                APPOINTMENT INFORMATION:        Monday Tuesday Wednesday Thursday Friday Saturday Sunday            Time:___________________AM  PM   Date:_____________________             CHARGE CAPTURE           **Please note: ICD descriptions below are intended for billing purposes only and may not represent clinical diagnoses**        Primary Diagnosis:         250.00 Type 2 diabetes            E11.9    Type 2 diabetes mellitus without complications              Orders:          62452   Office/outpatient visit; established patient, level 4  (In-House)             3014F   Screening mammography results documented and reviewed (PV)1  (In-House)             APPTO   Appointment need  (In-House)             76069*   Hgb A1c fast lab  (In-House)             27259   Collection of capillary blood specimen (eg, finger, heel, ear stick)  (In-House)           401.1 Hypertension            I10    Essential (primary) hypertension    272.4 Hyperlipidemia            E78.2    Mixed hyperlipidemia    V12.54 Personal history of transient ischemic attack (TIA), and cerebral infarction without residual deficits             Z86.73    Personal history of transient ischemic attack (TIA), and cerebral infarction without residual deficits    585.3 Chronic kidney disease, Stage III (moderate)            N18.3    Chronic kidney disease, stage 3 (moderate)    733.09 Osteoporosis, other            M81.8    Other osteoporosis without current pathological fracture    300.02 Anxiety            F41.9    Anxiety disorder, unspecified    V03.82 Vaccination against pneumococcal pneumonia            Z23    Encounter for immunization              Orders:          62631   Pneumococcal polysaccharide vaccine, 23-valent, adult or immunosuppressed patient dosage, for use in  (In-House)                                           Administration of pneumococcal vaccine (x1)             ADDENDUMS:      ____________________________________    Addendum: 11/06/2019 02:06 PM - Sharon Stinson         Visit Note Faxed to:        [facility], [facility]  (Nutritionist); Number [phone]

## 2021-05-18 NOTE — PROGRESS NOTES
Denisse Malave 1945     Office/Outpatient Visit    Visit Date: Wed, Jul 18, 2018 10:11 am    Provider: Geeta Patterson MD (Assistant: Theodora Castaneda MA)    Location: [facility]        Electronically signed by Geeta Patterson MD on  07/18/2018 01:11:44 PM                             SUBJECTIVE:        CC: Medicare wellness         HPI:     She is due for colonoscopy, has never been done.  Pap smears are no longer indicated by age and history.  She is due for mammogram, last done 7/2017.  She is due for DEXA.  She is due for Pneumovax, Prevnar, Shingrix, Td, and flu.  She is UTD on routine labs.  She is due for eye exam, last done 11/2017 by Dr. Shelyb.  She is UTD on foot exam, last done 3/2018.      Ms. Malave is here for a Medicare wellness visit.  Individual and family medical history was reviewed and updated A list of current providers and suppliers reviewed and updated A current height, weight, BMI, blood pressure, and pulse were recorded in the vitals section of the note and have been reviewed A review of cognitive impairment was performed and was negative.  A review of functional ability and level of safety was performed and was negative In regard to hearing, she reports having to strain to hear or understand conversations and wearing hearing aid(s), but not having trouble hearing the TV/radio when others do not.  Concerning home safety, she reports that at home she DOES have throw rugs, grab bars in the bath and functioning smoke alarms, but not poor lighting, a slippery bath or shower or handrails on stairs.      Physical Activity: ** Never exercises; Has fallen 2 or more times or had one fall with injury in the past year.  Advance Care Directive updated today in [facility] Tobacco: She has never smoked.    Preventative Health updated today         In regard to the screening for depression,         PHQ-9 Depression Screening: Completed form scanned and in chart; Total Score 17 Alcohol  "Consumption Screening: Completed form scanned and in chart; Total Score 0     ROS:     CONSTITUTIONAL:  Negative for fatigue and fever.      EYES:  Positive for eye pain ( right ) and \"double vision\" - lasts for 3 months.   Negative for blurred vision.      E/N/T:  Positive for diminished hearing ( bilaterally ).   Negative for nasal congestion.      CARDIOVASCULAR:  Negative for chest pain and palpitations.      RESPIRATORY:  Negative for recent cough and dyspnea.      GASTROINTESTINAL:  Negative for abdominal pain, constipation, diarrhea, nausea and vomiting.      GENITOURINARY:  Negative for dysuria and urinary incontinence.      MUSCULOSKELETAL:  Negative for arthralgias and myalgias.      INTEGUMENTARY/BREAST:  Negative for atypical mole(s) and rash.      NEUROLOGICAL:  Positive for vertigo.   Negative for paresthesias or weakness.      PSYCHIATRIC:  Positive for anxiety, crying spells, depression, anhedonia, difficulty concentrating and sleep disturbance.   Negative for suicidal thoughts.          PMH/FMH/SH:     Last Reviewed on 7/18/2018 10:47 AM by Geeta Patterson    Past Medical History:             PREVENTIVE HEALTH MAINTENANCE             EYE EXAM: Diabetic Eye Exam during this calendar year and results are in chart was last done 11/20/2017     Hepatitis C Medicare Screening: was last done 7/10/18     MAMMOGRAM: Done within last 2 years and results in are chart was last done 7/17/17 with normal results     PAP SMEAR: Hysterectomy         PAST MEDICAL HISTORY         Positive for    Hyperlipidemia and    Hypertension;     Positive for    Chronic Renal Failure and    Urinary Incontinence;     Positive for    Type 2 Diabetes: complications include nephropathy and peripheral neuropathy; ;     Positive for    Cerebrovascular Accident;     Positive for    Pernicious Anemia;         CURRENT MEDICAL PROVIDERS:    Ophthalmologist: Dr Shelby         PAST MEDICAL HISTORY             ADVANCE DIRECTIVE [employer] " "of   has copy         Surgical History:         Biopsy of breast; benign    Cholecystectomy    Hysterectomy: d/t concern for uterine cancer but this was benign;      rotator cuff repair R shoulder;    \"ear surgery\";         Family History:     Father: Coronary Artery Disease     Mother: Lung Cancer     Brother(s): Coronary Artery Disease     Sister(s): Neurological/Genetic Myasthenia Gravis     Son(s): Coronary Artery Disease         Social History:     Occupation:    Retired     Marital Status:      Children: 2 children 1 , 1 son Francisco Javier         Tobacco/Alcohol/Supplements:     Last Reviewed on 2018 10:47 AM by Geeta Patterson    Tobacco: She has never smoked.          Substance Abuse History:     Last Reviewed on 2018 10:47 AM by Geeta Patterson        Mental Health History:     Last Reviewed on 2018 10:47 AM by Geeta Patterson        Communicable Diseases (eg STDs):     Last Reviewed on 2018 10:47 AM by Geeta Patterson            Current Problems:     Last Reviewed on 7/10/2018 11:04 AM by Geeta Patterson    Hyperlipidemia     Hypertension     Type 2 diabetes     B12 deficiency     Chronic kidney disease, Stage III (moderate)     Personal history of transient ischemic attack (TIA), and cerebral infarction without residual deficits     Screening test for viral disease - unspecified     Dyspnea     Lower limb edema     Other specified administrative purpose         Immunizations:     None        Allergies:     Last Reviewed on 7/10/2018 11:04 AM by Geeta Patterson      No Known Drug Allergies.         Current Medications:     Last Reviewed on 7/10/2018 11:04 AM by Geeta Patterson    Losartan 100mg Tablet Take 1 tablet(s) by mouth daily     Accu-Chek Mary Plus Test Strips  Reagent Strips Test Blood Sugar qid DX E11.9     [place] (general)  Needle 31g 5mm pen needles, use as directed     Amlodipine  5mg Tablet 1 tab daily     Effexor XR 75mg Capsules, Extended Release " Take 3 capsule(s) by mouth daily     Oxybutynin Chloride 5mg Tablet 1 tab daily     Crestor 10mg Tablet 1 tab daily     Metformin HCl 1,000mg Tablet 1 tab daily     Basaglar KwikPen 100units/1ml Injection Inject 54 units every am     Humalog KwikPen 100units/1ml Pen System, Disposable Use as directed per sliding scale     Metoprolol Succinate 50mg Tablets, Extended Release one tablet po q hs     Doxycycline  Hyclate 100mg Tablet Take one tablet twice daily for 10 days     Mupirocin 2% Topical Ointment Apply small amount to affected area bid         OBJECTIVE:        Vitals:         Current: 7/18/2018 10:14:03 AM    Ht:  5 ft, 3.5 in;  Wt: 181.7 lbs;  BMI: 31.7    T: 97 F (oral);  BP: 123/70 mm Hg (left arm, sitting);  P: 73 bpm (left arm (BP Cuff), sitting);  sCr: 0.98 mg/dL;  GFR: 55.90        Exams:     PHYSICAL EXAM:     GENERAL: vital signs recorded - well developed, well nourished;  well groomed;  no apparent distress;     EYES: extraocular movements intact; conjunctiva and cornea are normal; PERRLA;     E/N/T: OROPHARYNX:  normal mucosa, dentition, gingiva, and posterior pharynx;     RESPIRATORY: normal respiratory rate and pattern with no distress; normal breath sounds with no rales, rhonchi, wheezes or rubs;     CARDIOVASCULAR: normal rate; rhythm is regular;  no systolic murmur; no edema;     GASTROINTESTINAL: nontender; normal bowel sounds;     LYMPHATIC: no enlargement of cervical or facial nodes;     MUSCULOSKELETAL: normal gait; normal overall tone     NEUROLOGIC: mental status: alert and oriented x 3; Reflexes: brachioradialis: 2+; knee jerks: 2+;     PSYCHIATRIC: appropriate affect and demeanor; normal psychomotor function; normal speech pattern;         Lab/Test Results:     AUDIO AND VISUAL SCREENING     Vision Testing: Near Right 20/200 Left 20/30 Bilateral 20/30; Performed by:  [facility]         Procedures:     Vaccination against pneumococcal pneumonia     1. Prevnar 13 (pneumococcal PCV13) given IM in  the left upper arm; administered by [facility];  lot number [id number]; expires 03/20             ASSESSMENT           V70.0   Z00.00  Health checkup              DDx:     V79.0   Z13.89  Screening for depression              DDx:     627.9   N95.9  Postmenopausal disorder              DDx:     250.00   E11.9  Type 2 diabetes              DDx:     V03.82   Z23  Vaccination against pneumococcal pneumonia              DDx:     E888.9   W19.XXXA  Fall NOS              DDx:         ORDERS:         Radiology/Test Orders:       3014F  Screening mammography results documented and reviewed (PV)1  (In-House)         2022F  Dilated retinal eye exam w/interpret by ophthalmologist/optometrist documented/reviewed (DM)4  (In-House)         56232  DXA, bone density study, 1 or more sites; axial skeleton (eg hips, pelvis, spine)  (Send-Out)         30270  Screening mammography bi 2-view inc CAD  (Send-Out)           Procedures Ordered:         Annual wellness visit, includes a PPPS, subsequent visit  (In-House)         REFER  Referral to Specialist or Other Facility  (Send-Out)         93493  Pneumococcal conjugate vaccine, 13 valent, for intramuscular use  (In-House)         REFER  Referral to Specialist or Other Facility  (Send-Out)           Other Orders:       1100F  Pt screen for fall risk; document 2+ falls in the past yr or any fall w/injury in past year (MICHAEL)  (In-House)           Negative EtOH screen  (In-House)           Calculated BMI above the upper parameter and a follow-up plan was documented in the medical record  (In-House)           Administration of pneumococcal vaccine (x1)                 PLAN:          Health checkup She is due for colonoscopy, has never been done.  She declines this today but states she will think about it.  Pap smears are no longer indicated by age and history.  She is due for mammogram, last done 7/2017; ordered.  She is due for DEXA; ordered.  She is due for Pneumovax, Prevnar,  Shingrix, Td, and flu.  Prevnar given today and she will get the Pneumovax in 1 year.  Declines other vaccines for now.  She is UTD on routine labs.  She is due for eye exam, last done 11/2017 by Dr. Shelby; ordered.  She is UTD on foot exam, last done 3/2018.  She has had multiple falls; we are getting her in for PT.  No memory issues, no signs/symptoms of depression.  She lives alone.  She is able to drive and perform ADLs/manage finances independently.  Hearing is adequate.  She has a living will and preventive services handout and safety handout were given to her.  Current doctor list updated.  RTC 3 months.         RADIOLOGY:  I have ordered screening mammogram to be done today.  MIPS PHQ-9 Depression Screening: Completed form scanned and in chart; Total Score 17 Negative alcohol screen     MAMMOGRAM: Done within last 2 years and results in are chart     BMI Elevated - Follow-Up Plan: She was provided education on weight loss strategies           Orders:         Annual wellness visit, includes a PPPS, subsequent visit  (In-House)         1100F  Pt screen for fall risk; document 2+ falls in the past yr or any fall w/injury in past year (MICHAEL)  (In-House)           Negative EtOH screen  (In-House)         3014F  Screening mammography results documented and reviewed (PV)1  (In-House)           Calculated BMI above the upper parameter and a follow-up plan was documented in the medical record  (In-House)         2022F  Dilated retinal eye exam w/interpret by ophthalmologist/optometrist documented/reviewed (DM)4  (In-House)         19397  Screening mammography bi 2-view inc CAD  (Send-Out)             Patient Education Handouts:       Physical Exam 60+ year, Female           Screening for depression Score of 17.  Currently being treated for depression.          Postmenopausal disorder         RADIOLOGY:  I have ordered Dexa Scan to be done today.            Orders:       62949  DXA, bone density study, 1  or more sites; axial skeleton (eg hips, pelvis, spine)  (Send-Out)            Type 2 diabetes A1c 9.1.  Increase basaglar to 60 units daily.  Continue to check at home and record values, bringing log to next visit.  Eye exam referral placed.  Foot exam UTD.         REFERRALS:  Referral initiated to an ophthalmologist ( Dr. Dr. Biju Shelby; to perform diabetic eye exam ).            Orders:       REFER  Referral to Specialist or Other Facility  (Send-Out)            Vaccination against pneumococcal pneumonia         IMMUNIZATIONS given today: Prevnar 13.            Orders:       90772  Pneumococcal conjugate vaccine, 13 valent, for intramuscular use  (In-House)                     Administration of pneumococcal vaccine (x1)          Fall NOS H/o recurrent falls.  Will get her in for PT for gait training and strengthening.         REFERRALS:  Referral initiated to physical therapy ( at [facility]; for evaluation of recurrent falls ).            Orders:       REFER  Referral to Specialist or Other Facility  (Send-Out)               CHARGE CAPTURE           **Please note: ICD descriptions below are intended for billing purposes only and may not represent clinical diagnoses**        Primary Diagnosis:         V70.0 Health checkup            Z00.00    Encntr for general adult medical exam w/o abnormal findings              Orders:             Annual wellness visit, includes a PPPS, subsequent visit  (In-House)             1100F   Pt screen for fall risk; document 2+ falls in the past yr or any fall w/injury in past year (MICHAEL)  (In-House)                Negative EtOH screen  (In-House)             3014F   Screening mammography results documented and reviewed (PV)1  (In-House)                Calculated BMI above the upper parameter and a follow-up plan was documented in the medical record  (In-House)             2022F   Dilated retinal eye exam w/interpret by ophthalmologist/optometrist  documented/reviewed (DM)4  (In-House)           V79.0 Screening for depression            Z13.89    Encounter for screening for other disorder    627.9 Postmenopausal disorder            N95.9    Unspecified menopausal and perimenopausal disorder    250.00 Type 2 diabetes            E11.9    Type 2 diabetes mellitus without complications    V03.82 Vaccination against pneumococcal pneumonia            Z23    Encounter for immunization              Orders:          40924   Pneumococcal conjugate vaccine, 13 valent, for intramuscular use  (In-House)                                           Administration of pneumococcal vaccine (x1)         E888.9 Fall NOS            W19.XXXA    Unspecified fall, initial encounter        ADDENDUMS:      ____________________________________    Date: 07/20/2018 01:51 PM    Author: Sharon Stinson         Visit Note Faxed to:        Physical  ([facility]), [facility]; Number [phone]

## 2021-05-18 NOTE — PROGRESS NOTES
Denisse Malave 1945     Office/Outpatient Visit    Visit Date: Thu, Oct 18, 2018 09:39 am    Provider: Geeta Patterson MD (Assistant: Rosa Arguello MA)    Location: [facility]        Electronically signed by Geeta Patterson MD on  10/18/2018 07:11:47 PM                             SUBJECTIVE:        CC:     Ms. Malave is a 73 year old White female.  This is a follow-up visit.  patient presents today for three month follow up;         HPI: Ms. Malave is here for her three month checkup, she would not like a flu shot.        She has many falls for many years. She was doing PT which was helping, she stopped three weeks ago and has had three falls since.  She gets dizzy before. She thinks it is due to her hearing problems. 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 She has had many ear surgeries because she has a history of recurrent ear infections.  She gets dizzy when lying down.  She reports vertigo.  She doesn't drink water much, but she feels hungry all the time.          Her left elbow hurts because he had a bad fall in 01/2018 that resulted in fracture. She went to [place] [facility] and they gave her braces which did not help.  She endorses pain when she moves it a certain way.  It hurts on flexion and when she abducts her arm. She denies numbness and tingling.         T2DM- she takes metformin, Basaglar, and Humalog.  She occasionally check her BS at home and she usually runs 200-300. Her last A1C in 7/2018 was 9.7%.  She endorses recently losing 5 pounds.  She endorses constantly being hungry.  She usually eats out at [employer] for lunch and dinner.  Her last eye appointment was 09/2018 with Dr. Shelby and it showed mild diabetic retinopathy and macular edema. She endorses increased thirst, numbness/tingling in hands and feet, blurry vision.         HTN- for her HTN she takes amlodipine and losartan.  She endorses good blood pressures at home.  She is elevated today in  "clinic.        High cholesterol- she takes rosuvastatin.  On 07/2018 her total cholesterol was 165mg.  She says she has a hard time finding the right foods to eat.         She is on Effexor XR for depression. She has been on it for 15 years. She feels well-controlled on this medication.         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  She has to have this removed by a surgeon next Friday.         ROS:     CONSTITUTIONAL:  Negative for fatigue and fever.      EYES:  Positive for blurred vision, eye pain ( right ) and \"double vision\" - lasts for 3 months, has happened 3x.      E/N/T:  Positive for diminished hearing ( bilaterally ), tinnitus, nasal congestion and frequent rhinorrhea.      CARDIOVASCULAR:  Negative for chest pain and palpitations.      RESPIRATORY:  Negative for recent cough and dyspnea.      GASTROINTESTINAL:  Positive for abdominal pain ( periumbilical ), constipation and nausea.   Negative for diarrhea or vomiting.      GENITOURINARY:  Negative for dysuria and urinary incontinence.      MUSCULOSKELETAL:  Positive for arthralgias (Left elbow).   Negative for myalgias.      INTEGUMENTARY/BREAST:  Positive for rash Squamous cell carcinoma on R leg.   Negative for atypical mole(s).      NEUROLOGICAL:  Positive for ataxia, dizziness, vertigo and weakness ( bilateral lower extremity ).   Negative for paresthesias.      PSYCHIATRIC:  Positive for anxiety, crying spells, depression, anhedonia, difficulty concentrating and sleep disturbance.   Negative for suicidal thoughts.          PMH/FMH/SH:     Last Reviewed on 10/18/2018 10:47 AM by Geeta Patterson    Past Medical History:             PREVENTIVE HEALTH MAINTENANCE             BONE DENSITY: was last done 8/15/18 with the following abnormality noted-- osteoporosis     EYE EXAM: Diabetic Eye Exam during this calendar year and results are in chart was last done 9/13/2018     Hepatitis C Medicare Screening: " "was last done 7/10/18     MAMMOGRAM: Done within last 2 years and results in are chart was last done 8/15/2018 with normal results     PAP SMEAR: No longer indicated due to age and history s/p hysterectomy         PAST MEDICAL HISTORY         Positive for    Hyperlipidemia and    Hypertension;     Positive for    Chronic Renal Failure and    Urinary Incontinence;     Positive for    Type 2 Diabetes: complications include nephropathy and peripheral neuropathy; ;     Positive for    Cerebrovascular Accident;     Positive for    Pernicious Anemia;         CURRENT MEDICAL PROVIDERS:    Ophthalmologist: Dr Shelby         PAST MEDICAL HISTORY             ADVANCE DIRECTIVE Durable Power of   has copy         Surgical History:         Biopsy of breast; benign    Cholecystectomy    Hysterectomy: d/t concern for uterine cancer but this was benign;      rotator cuff repair R shoulder;    \"ear surgery\";         Family History:     Father: Coronary Artery Disease     Mother: Lung Cancer     Brother(s): Coronary Artery Disease     Sister(s): Neurological/Genetic Myasthenia Gravis     Son(s): Coronary Artery Disease         Social History:     Occupation:    Retired     Marital Status:      Children: 2 children 1 , 1 son Francisco Javier         Tobacco/Alcohol/Supplements:     Last Reviewed on 10/18/2018 10:47 AM by Geeta Patterson    Tobacco: She has never smoked.          Substance Abuse History:     Last Reviewed on 10/18/2018 10:47 AM by Geeta Patterson        Mental Health History:     Last Reviewed on 10/18/2018 10:47 AM by Geeta Patterson        Communicable Diseases (eg STDs):     Last Reviewed on 10/18/2018 10:47 AM by Geeta Patterson            Current Problems:     Last Reviewed on 2018 10:47 AM by Geeta Patterson    Osteoporosis, other     Postmenopausal osteoporosis     Fall NOS     Hyperlipidemia     Hypertension     Type 2 diabetes     B12 deficiency     Chronic kidney disease, Stage III " (moderate)     Personal history of transient ischemic attack (TIA), and cerebral infarction without residual deficits     Other specified administrative purpose         Immunizations:     Prevnar 13 (Pneumococcal PCV 13) 7/18/2018         Allergies:     Last Reviewed on 7/18/2018 10:47 AM by Geeta Patterson      No Known Drug Allergies.         Current Medications:     Last Reviewed on 7/18/2018 10:47 AM by Geeta Patterson    Crestor 10mg Tablet 1 tab daily     Oxybutynin Chloride 5mg Tablet 1 tab daily     Amlodipine  5mg Tablet 1 tab daily     Losartan 100mg Tablet Take 1 tablet(s) by mouth daily     Accu-Chek Mary Plus Test Strips  Reagent Strips Test Blood Sugar qid DX E11.9     Lookout (general)  Needle 31g 5mm pen needles, use as directed     Effexor XR 75mg Capsules, Extended Release Take 3 capsule(s) by mouth daily     Metformin HCl 1,000mg Tablet 1 tab daily     Tramadol 50mg Tablet Take 1 tablet Q8 hours prn for breakthrough pain     Basaglar KwikPen 100units/1ml Injection 60 units SC QAM (Patient Assistance)     Humalog KwikPen 100units/1ml Pen System, Disposable Use as directed per sliding scale     Metoprolol Succinate 50mg Tablets, Extended Release one tablet po q hs     Doxycycline  Hyclate 100mg Tablet Take one tablet twice daily for 10 days     Mupirocin 2% Topical Ointment Apply small amount to affected area bid         OBJECTIVE:        Vitals:         Current: 10/18/2018 9:46:15 AM    Ht:  5 ft, 3.5 in;  Wt: 176.2 lbs;  BMI: 30.7    T: 98.7 F (oral);  BP: 163/70 mm Hg (left arm, sitting);  P: 64 bpm (left arm (BP Cuff), sitting);  sCr: 0.98 mg/dL;  GFR: 55.18        Repeat:     11:48:36 AM     BP:   160/78mm Hg (right arm, sitting)         Exams:     PHYSICAL EXAM:     GENERAL: vital signs recorded - well developed, well nourished;  well groomed;  no apparent distress;     EYES: extraocular movements intact; conjunctiva and cornea are normal; PERRLA;     E/N/T: OROPHARYNX:  normal mucosa,  dentition, gingiva, and posterior pharynx; +Laura-Hallpike;     RESPIRATORY: normal respiratory rate and pattern with no distress; normal breath sounds with no rales, rhonchi, wheezes or rubs;     CARDIOVASCULAR: normal rate; rhythm is regular;  no systolic murmur; no edema;     GASTROINTESTINAL: nontender; normal bowel sounds;     MUSCULOSKELETAL: normal gait; normal overall tone     NEUROLOGIC: mental status: alert and oriented x 3; cranial nerves II-XII grossly intact; Sensation: intact to light touch;  Reflexes: brachioradialis: 2+; knee jerks: 2+;         Lab/Test Results:             Hemoglobin A1c:  8.8 (10/18/2018),     Performed by::  tls (10/18/2018),             ASSESSMENT           780.4   H81.12  Vertigo              DDx:     E888.9   W19.XXXA  Fall NOS              DDx:     250.00   E11.9  Type 2 diabetes              DDx:     401.1   I10  Hypertension              DDx:     272.4   E78.2  Hyperlipidemia              DDx:         ORDERS:         Lab Orders:       [id number]*  Hgb A1c fast lab  (In-House)         APPTO  Appointment need  (In-House)           Procedures Ordered:       REFER  Referral to Specialist or Other Facility  (Send-Out)         [id number]  Collection of capillary blood specimen (eg, finger, heel, ear stick)  (In-House)                   PLAN:          Javiertigo Denisse's main complaint is her recurrent falls.  These have been going on for years now.  She is not the best historian, so it is a little tough to tell if the falls are all coming from the same issue -- they seem to happen in a variety of circumstances with a variety of associated sx.  She does report today episodes of vertigo which seem positional in nature.  She also has a positive [place]-Hallpike.  I am going to send her for Epley maneuvers with PT to see if this helps.  She was doing strengthening with [facility] up until 3 weeks ago when she was discharged.  She does state that doing PT helped.  However, she has had 3 falls since  "stopping.  May also consider doing a C-spine MRI in the future to look for cervical stenosis if she continues to have problems.         REFERRALS:  Referral initiated to physical therapy ( at [facility]; for Epley maneuvers ).      FOLLOW-UP: Schedule a follow-up visit in 3 months..  f/u vertigo with [place]           Orders:       REFER  Referral to Specialist or Other Facility  (Send-Out)         APPTO  Appointment need  (In-House)            Fall NOS As above.          Type 2 diabetes She is very poorly compliant with dietary recommendations.  Has seen the dietician but \"she didn't really help me.\"  Discussed changes to diet including decreasing fast food, decreasing carbs/sugars, increasing healthy fats/protein.  Checking A1c today.  Will need adjustment to her basaglar.  Discussed that poor control of diabetes may also be contributing to her falls.     LABORATORY:  Labs ordered to be performed today include Hgb A1c inhouse fast lab.            Orders:       [id number]*  Hgb A1c fast lab  (In-House)         25904  Collection of capillary blood specimen (eg, finger, heel, ear stick)  (In-House)            Hypertension Above goal.  She has not yet taken her meds today.  Stressed importance of taking meds regularly.          Hyperlipidemia Stable.  No refills needed.             Patient Recommendations:        For  Vertigo:     Schedule a follow-up visit in 3 months.                APPOINTMENT INFORMATION:        Monday Tuesday Wednesday Thursday Friday Saturday Sunday            Time:___________________AM  PM   Date:_____________________             CHARGE CAPTURE           **Please note: ICD descriptions below are intended for billing purposes only and may not represent clinical diagnoses**        Primary Diagnosis:         780.4 Vertigo            H81.12    Benign paroxysmal vertigo, left ear              Orders:          [id number]   Office/outpatient visit; established patient, level 4  (In-House)             " APPTO   Appointment need  (In-House)           E888.9 Fall NOS            W19.XXXA    Unspecified fall, initial encounter    250.00 Type 2 diabetes            E11.9    Type 2 diabetes mellitus without complications              Orders:          [id number]*   Hgb A1c fast lab  (In-House)             80027   Collection of capillary blood specimen (eg, finger, heel, ear stick)  (In-House)           401.1 Hypertension            I10    Essential (primary) hypertension    272.4 Hyperlipidemia            E78.2    Mixed hyperlipidemia

## 2021-05-18 NOTE — PROGRESS NOTES
"Denisse Malave  1945     Office/Outpatient Visit    Visit Date: Tue, Jun 16, 2020 11:31 am    Provider: Geeta Patterson MD (Assistant: Tiki Shah, )    Location: [facility]        Electronically signed by Geeta Patterson MD on  06/16/2020 01:06:18 PM                             Subjective:        CC: Ms. Malave is a 75 year old White female.  This is a follow-up visit.          HPI: Denisse is here today to follow up on a fall on Sunday (2 days ago).  She has been feeling poorly lately with increased falls.  Sharon talked to her son, who indicates that he feels that her issues are coming from her diabetic control (or lack thereof).  He says she frequently does not take her insulin and her glucometer shows values regularly in the 200-400s.  She was doing much better when he was coming over daily from [facility] to help with med management, but he has not been able to do that recently.  She is on metformin, Basaglar, and Humalog for DM.  She is now following with Ira France.  She is UTD on eye exam (last done 9/2019).  She feels confused at times.        Alexei is here with Denisse today and also expressed his view that Denisse frequently does not take her insulin, or takes it at the improper time (for example, waiting until 4:00PM to take what should be a morning dose of Basaglar).  They both note she frequently skips meals.  She checks her blood sugar only once daily, if at all, despite the fact that she is (supposed to be) taking insulin 4 times daily.  She brings her glucometer in today and it shows values ranging from 60-400s, but very few values in a normal range.  Denisse notes that she frequently eats things she knows she should not, including \"a gallon of orange juice\" and ice cream, regularly.  She has been to the dietician (Kimberlee Mohan) multiple times, but \"I don't remember what she told me.\"        She is on amlodipine and losartan for HTN.  BP has been well controlled when she " checks it at home.  She has not taken her meds yet today.  No CP, palpitations, SOB.        She is on rosuvastatin for HLD and h/o stroke.  No myalgias or other adverse effects.        She is on oxybutynin for urge incontinence.    ROS:     CONSTITUTIONAL:  Negative for fatigue and fever.      EYES:  Negative for blurred vision.      E/N/T:  Positive for diminished hearing and nasal congestion.   Negative for frequent rhinorrhea.      CARDIOVASCULAR:  Negative for chest pain and palpitations.      RESPIRATORY:  Negative for recent cough and dyspnea.      GASTROINTESTINAL:  Negative for abdominal pain, constipation, diarrhea, nausea and vomiting.      MUSCULOSKELETAL:  Positive for arthralgias.   Negative for myalgias.      PSYCHIATRIC:  Negative for anxiety, depression and sleep disturbance.          Past Medical History / Family History / Social History:         Last Reviewed on 6/16/2020 11:50 AM by Geeta Patterson    Past Medical History:             PREVENTIVE HEALTH MAINTENANCE             BONE DENSITY: was last done 8/15/18 with the following abnormality noted-- osteoporosis     COLORECTAL CANCER SCREENING: declines colorectal cancer screening, understands reason for testing     EYE EXAM: Diabetic Eye Exam during this calendar year and results are in chart was last done 9/17/19 The next one is due  6 months mild/moderate diabetic retinopathy     Hepatitis C Medicare Screening: was last done 7/10/18     MAMMOGRAM: Done within last 2 years and results in are chart was last done 8/22/2019 with normal results     PAP SMEAR: No longer indicated due to age and history s/p hysterectomy         PAST MEDICAL HISTORY         Positive for    Hyperlipidemia and    Hypertension;     Positive for    Chronic Renal Failure and    Urinary Incontinence;     Positive for    Type 2 Diabetes: complications include nephropathy and peripheral neuropathy; ;     Positive for    Cerebrovascular Accident;     Positive for    Pernicious  "Anemia;         CURRENT MEDICAL PROVIDERS:    Cardiologist: Dr Munroe    Endocrinologist: Ira France NP    E/N/T: Dr Christianson    Ophthalmologist: Dr Shelby             ADVANCE DIRECTIVES: Durable Power of   has copy         Surgical History:         Biopsy of breast; benign    Cholecystectomy    Hysterectomy: d/t concern for uterine cancer but this was benign;     rotator cuff repair R shoulder;    \"ear surgery\";         Family History:     Father: Coronary Artery Disease     Mother: Lung Cancer     Brother(s): Coronary Artery Disease     Sister(s): Neurological/Genetic Myasthenia Gravis     Son(s): Coronary Artery Disease         Social History:     Occupation: Retired (Prior occupation: )     Marital Status:      Children: 2 children 1 , 1 son Francisco Javier         Functional Status: relies on her siblings for assistance with care         Tobacco/Alcohol/Supplements:     Last Reviewed on 2020 11:50 AM by Geeta Patterson    Tobacco: She has never smoked.  Non-drinker         Substance Abuse History:     Last Reviewed on 2020 11:50 AM by Geeta Patterson        Mental Health History:     Last Reviewed on 2020 11:50 AM by Geeta Patterson        Communicable Diseases (eg STDs):     Last Reviewed on 2020 11:50 AM by Geeta Patterson        Current Problems:     Last Reviewed on 2020 10:05 AM by Geeta Patterson    Vitamin B12 defic anemia due to intrinsic factor deficiency    CKD - Chronic kidney disease, stage 3 (moderate)    H/o stroke - Personal history of transient ischemic attack (TIA), and cerebral infarction without residual deficits    HLD - Mixed hyperlipidemia    HTN - Essential (primary) hypertension    Other osteoporosis without current pathological fracture    Benign paroxysmal vertigo, left ear    Anxiety disorder, unspecified    Type 2 diabetes mellitus with hyperglycemia    Repeated falls    Encounter for screening for depression    Muscle " weakness (generalized)    Unspecified injury of right elbow, initial encounter    Age-related cognitive decline    Postconcussional syndrome    Unspecified injury of head, initial encounter    Intercostal pain    Acute maxillary sinusitis, unspecified        Immunizations:     Prevnar 13 (Pneumococcal PCV 13) 7/18/2018    Pneumococcal, 23-valent IM/SC (adult and pt >=2yr) 10/31/2019        Allergies:     Last Reviewed on 4/22/2020 10:05 AM by Geeta Patterson    No Known Allergies.        Current Medications:     Last Reviewed on 4/22/2020 10:05 AM by Geeta Patterson    Crestor 10mg Tablet [1 tab daily]    Amlodipine  5 mg oral tablet [1 tab daily]    Losartan 100 mg oral tablet [Take 1 tablet(s) by mouth daily]    metFORMIN 1,000 mg oral tablet [1 tab daily]    metoprolol succinate 50 mg oral Tablet, Extended Release 24 hr [TAKE ONE TABLET BY MOUTH EVERY NIGHT AT BEDTIME]    oxybutynin chloride 5 mg oral tablet [TAKE ONE TABLET BY MOUTH DAILY]    Basaglar KwikPen U-100 Insulin 100 unit/mL (3 mL) subcutaneous Insulin Pen [75 units QAM (Patient Assistance)]    HumaLOG KwikPen Insulin 100 unit/mL subcutaneous Insulin Pen [Administer based on meal size: small=15 units, med=20 units, large=25 units; plus BS >150 give: 2 units 151-175 and increase by 1 unit for every 25 mg/dL thereafter (patient Assistance)]    Needles (general)  Needle [31g 5mm pen needles, use as directed]    Accu-Chek Mary Plus Test Strips  Reagent Strips [Test Blood Sugar qid DX E11.9]    alendronate 70 mg oral tablet [once a week]    venlafaxine 75 mg oral tablet [TAKE THREE TABLETS BY MOUTH DAILY WITH FOOD]        Objective:        Vitals:         Current: 6/16/2020 11:37:54 AM    Ht:  5 ft, 3.5 in;  Wt: 172.6 lbs;  BMI: 30.1T: 96.9 F (oral);  BP: 145/67 mm Hg (left arm, sitting);  P: 66 bpm (left arm (BP Cuff), sitting);  sCr: 0.92 mg/dL;  GFR: 56.61        Exams:     PHYSICAL EXAM:     GENERAL: vital signs recorded - well developed, well  nourished;  well groomed;  no apparent distress;     EYES: extraocular movements intact; conjunctiva and cornea are normal; PERRLA;     E/N/T: OROPHARYNX:  normal mucosa, dentition, gingiva, and posterior pharynx;     RESPIRATORY: normal respiratory rate and pattern with no distress;     MUSCULOSKELETAL: normal gait; normal overall tone     PSYCHIATRIC: appropriate affect and demeanor; normal psychomotor function; normal speech pattern;         Assessment:         R29.6   Repeated falls       E11.65   Type 2 diabetes mellitus with hyperglycemia           ORDERS:         Radiology/Test Orders:       2022F  Dilated retinal eye exam w/interpret by ophthalmologist/optometrist documented/reviewed (DM)4  (In-House)              Other Orders:         Screening mammogram results documented  (Send-Out)                      Plan:         Repeated fallsDenisse and Alexei both acknowledge some serious shortcomings in Denisse's control of her diabetes.  We had an extensive discussion today about ways she needs to improve.  She needs to work on eating consistent meals; checking her blood sugar regularly; adhering to a diabetic diet; and using her insulin as prescribed.  We discussed that she may need additional help, either assisted living or an in-home caregiver to come in.  Will see about getting her back in with Kimberlee Mohan as well, although I am not sure this will be helpful unless Denisse is committed.  We will also spot her oxybutynin, to see if this is contributing to the falls and dizziness, but I think the uncontrolled highs and lows of blood sugar is the most likely culprit.        30 min was spent in this face to face encounter.    MIPS Vaccines Flu and Pneumonia updated in Shot record Screening mammomgram done within last 2 years and results in are chart Diabetic Eye Exam during this calendar year and results are in chart     FOLLOW-UP: Keep currently scheduled appointment.:.            Orders:       2022F   Dilated retinal eye exam w/interpret by ophthalmologist/optometrist documented/reviewed (DM)4  (In-House)              Screening mammogram results documented  (Send-Out)              Type 2 diabetes mellitus with hyperglycemiaAs above.            Patient Recommendations:        For  Repeated falls:                    APPOINTMENT INFORMATION:        Monday Tuesday Wednesday Thursday Friday Saturday Sunday            Time:___________________AM  PM   Date:_____________________             Charge Capture:         Primary Diagnosis:     R29.6  Repeated falls           Orders:      23201  Office/outpatient visit; established patient, level 4  (In-House)            2022F  Dilated retinal eye exam w/interpret by ophthalmologist/optometrist documented/reviewed (DM)4  (In-House)              E11.65  Type 2 diabetes mellitus with hyperglycemia

## 2021-05-18 NOTE — PROGRESS NOTES
Denisse Malave 1945     Office/Outpatient Visit    Visit Date: Tue, Jan 22, 2019 03:46 pm    Provider: Geeta Patterson MD (Assistant: Sarah Spurling, MA)    Location: [facility]        Electronically signed by Geeta Patterson MD on  01/22/2019 04:28:24 PM                             SUBJECTIVE:        CC:     Ms. Malave is a 73 year old White female.  This is a follow-up visit.          HPI: Ms. Malave is here today for routine f/u of chronic issues.        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  She got a course of doxycycline and it has really improved.  No more red erythema or drainage.          She continues to have dizzy spells.  She has completed PT.  She thinks it is due to her hearing problems. 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 She has had many ear surgeries because she has a history of recurrent ear infections.  She describes the dizziness as vertigo.        She is on metformin, Basaglar, and Humalog (which she uses just once daily in the morning) for DM.  She is due for labs today.  She is UTD on eye exam (9/2018).  She is UTD on foot exam (3/2018).  She is on an ASA, ARB and statin.        She is on amlodipine and losartan for HTN.  BP has been well controlled when she checks it at home.  No CP, palpitations, SOB.        She is on rosuvastatin for HLD.  No myalgias.        She is on Effexor XR for depression. Stable.  No depression or anhedonia.  No anxiety or panic attacks.     ROS:     CONSTITUTIONAL:  Negative for fatigue and fever.      EYES:  Negative for blurred vision.      E/N/T:  Positive for diminished hearing, tinnitus and nasal congestion.   Negative for frequent rhinorrhea.      CARDIOVASCULAR:  Negative for chest pain and palpitations.      RESPIRATORY:  Negative for recent cough and dyspnea.      GASTROINTESTINAL:  Negative for abdominal pain,  "constipation, diarrhea, nausea and vomiting.      MUSCULOSKELETAL:  Positive for arthralgias.   Negative for myalgias.      NEUROLOGICAL:  Positive for ataxia, dizziness, vertigo and weakness ( bilateral lower extremity ).   Negative for paresthesias.      PSYCHIATRIC:  Negative for anxiety, depression and sleep disturbance.          PMH/FMH/SH:     Last Reviewed on 1/22/2019 04:14 PM by Geeta Patterson    Past Medical History:             PREVENTIVE HEALTH MAINTENANCE             BONE DENSITY: was last done 8/15/18 with the following abnormality noted-- osteoporosis     EYE EXAM: Diabetic Eye Exam during this calendar year and results are in chart was last done 9/13/2018     Hepatitis C Medicare Screening: was last done 7/10/18     MAMMOGRAM: Done within last 2 years and results in are chart was last done 8/15/2018 with normal results     PAP SMEAR: No longer indicated due to age and history s/p hysterectomy         PAST MEDICAL HISTORY         Positive for    Hyperlipidemia and    Hypertension;     Positive for    Chronic Renal Failure and    Urinary Incontinence;     Positive for    Type 2 Diabetes: complications include nephropathy and peripheral neuropathy; ;     Positive for    Cerebrovascular Accident;     Positive for    Pernicious Anemia;         CURRENT MEDICAL PROVIDERS:    Ophthalmologist: Dr Shelby         PAST MEDICAL HISTORY             ADVANCE DIRECTIVE Durable Power of   has copy         Surgical History:         Biopsy of breast; benign    Cholecystectomy    Hysterectomy: d/t concern for uterine cancer but this was benign;      rotator cuff repair R shoulder;    \"ear surgery\";         Family History:     Father: Coronary Artery Disease     Mother: Lung Cancer     Brother(s): Coronary Artery Disease     Sister(s): Neurological/Genetic Myasthenia Gravis     Son(s): Coronary Artery Disease         Social History:     Occupation:    Retired     Marital Status:      Children: 2 " children 1 , 1 son Francisco Javier         Tobacco/Alcohol/Supplements:     Last Reviewed on 2019 04:14 PM by Geeta Patterson    Tobacco: She has never smoked.          Substance Abuse History:     Last Reviewed on 2019 04:14 PM by Geeta Pattreson        Mental Health History:     Last Reviewed on 2019 04:14 PM by Geeta Patterson        Communicable Diseases (eg STDs):     Last Reviewed on 2019 04:14 PM by Geeta Patterson            Current Problems:     Last Reviewed on 2019 11:24 AM by Geeta Patterson    Vertigo     Osteoporosis, other     Postmenopausal osteoporosis     Fall NOS     Hyperlipidemia     Hypertension     Type 2 diabetes     B12 deficiency     Chronic kidney disease, Stage III (moderate)     Personal history of transient ischemic attack (TIA), and cerebral infarction without residual deficits     Leg swelling     Other specified administrative purpose         Immunizations:     Prevnar 13 (Pneumococcal PCV 13) 2018         Allergies:     Last Reviewed on 2019 11:24 AM by Geeta Patterson      No Known Drug Allergies.         Current Medications:     Last Reviewed on 2019 11:24 AM by Geeta Patterson    Effexor XR 75mg Capsules, Extended Release Take 3 capsule(s) by mouth daily     Crestor 10mg Tablet 1 tab daily     Oxybutynin Chloride 5mg Tablet 1 tab daily     Amlodipine  5mg Tablet 1 tab daily     Losartan 100mg Tablet Take 1 tablet(s) by mouth daily     Accu-Chek Mary Plus Test Strips  Reagent Strips Test Blood Sugar qid DX E11.9     [place] (general)  Needle 31g 5mm pen needles, use as directed     Metformin HCl 1,000mg Tablet 1 tab daily     Basaglar KwikPen 100units/1ml Injection 65 units SC QAM (Patient Assistance)     Humalog KwikPen 100units/1ml Pen System, Disposable Use as directed per sliding scale     Metoprolol Succinate 50mg Tablets, Extended Release one tablet po q hs     Doxycycline Monohydrate 100mg Capsules Take 1 capsule(s) by mouth bid x10 days      Mupirocin 2% Topical Ointment Apply small amount to affected area bid         OBJECTIVE:        Vitals:         Current: 1/22/2019 3:53:52 PM    Ht:  5 ft, 3.5 in;  Wt: 173 lbs;  BMI: 30.2    T: 97.9 F (oral);  BP: 106/84 mm Hg (right arm, sitting);  P: 89 bpm (right arm (BP Cuff), sitting);  sCr: 0.98 mg/dL;  GFR: 54.75        Exams:     PHYSICAL EXAM:     GENERAL: vital signs recorded - well developed, well nourished;  well groomed;  no apparent distress;     EYES: extraocular movements intact; conjunctiva and cornea are normal; PERRLA;     E/N/T: OROPHARYNX:  normal mucosa, dentition, gingiva, and posterior pharynx;     RESPIRATORY: normal respiratory rate and pattern with no distress; normal breath sounds with no rales, rhonchi, wheezes or rubs;     CARDIOVASCULAR: normal rate; rhythm is regular;  no systolic murmur; no edema;     GASTROINTESTINAL: nontender; normal bowel sounds;     MUSCULOSKELETAL: normal gait; normal overall tone     NEUROLOGIC: mental status: alert and oriented x 3; Reflexes: brachioradialis: 2+; knee jerks: 2+;         ASSESSMENT           780.4   H81.12  Vertigo              DDx:     250.00   E11.9  Type 2 diabetes              DDx:     401.1   I10  Hypertension              DDx:     272.4   E78.2  Hyperlipidemia              DDx:     V12.54   Z86.73  Personal history of transient ischemic attack (TIA), and cerebral infarction without residual deficits              DDx:     585.3   N18.3  Chronic kidney disease, Stage III (moderate)              DDx:         ORDERS:         Lab Orders:       [id number]  [address] [place] [address] LIPID,CMP, A1C: 34180, 31789, 05377  (Send-Out)         14502  [facility] Microablbumin, quantitative  (Send-Out)         APPTO  Appointment need  (In-House)           Procedures Ordered:       REFER  Referral to Specialist or Other Facility  (Send-Out)                   PLAN:          Vertigo Vertigo persists.  Cardiology has pretty conclusively ruled out any cardiac etiology  of dizziness at this point.  She has seen a couple other ENTs in the past, but none recently.  She is interested in getting in with Dr. Christianson to see if he has any ideas.  Referral placed today.  RTC 3 months.         REFERRALS:  Referral initiated to an E/N/T ( Dr. Asa Christianson, [facility]; for evaluation of vertigo, hearing loss ).      FOLLOW-UP: Schedule a follow-up visit in 3 months..  f/u DM with [place]           Orders:       REFER  Referral to Specialist or Other Facility  (Send-Out)         APPTO  Appointment need  (In-House)             Patient Education Handouts:       [facility]           Type 2 diabetes She is on metformin, Basaglar, and Humalog (which she uses just once daily in the morning) for DM.  No refills needed.  She is due for labs today; ordered.  She is UTD on eye exam (9/2018).  She is UTD on foot exam (3/2018).  She is on an ASA, ARB and statin.     LABORATORY:  Labs ordered to be performed today include Diabetes Panel 1; CMP, Lipid, A1C and Microalbumin.            Orders:       [id number]  DI[facility] - [facility] LIPID,CMP, A1C: 44897, 36084, 15457  (Send-Out)         45534  [facility] - [facility] Microablbumin, quantitative  (Send-Out)            Hypertension BP at goal.  No refills needed.  Checking labs.          Hyperlipidemia No refills needed.  Checking labs.          Chronic kidney disease, Stage III (moderate) Checking labs.             Patient Recommendations:        For  Vertigo:     Schedule a follow-up visit in 3 months.                APPOINTMENT INFORMATION:        Monday Tuesday Wednesday Thursday Friday Saturday Sunday            Time:___________________AM  PM   Date:_____________________             CHARGE CAPTURE           **Please note: ICD descriptions below are intended for billing purposes only and may not represent clinical diagnoses**        Primary Diagnosis:         780.4 Vertigo            H81.12    Benign paroxysmal vertigo, left ear              Orders:           80790   Office/outpatient visit; established patient, level 4  (In-House)             APPTO   Appointment need  (In-House)           250.00 Type 2 diabetes            E11.9    Type 2 diabetes mellitus without complications    401.1 Hypertension            I10    Essential (primary) hypertension    272.4 Hyperlipidemia            E78.2    Mixed hyperlipidemia    V12.54 Personal history of transient ischemic attack (TIA), and cerebral infarction without residual deficits            Z86.73    Personal history of transient ischemic attack (TIA), and cerebral infarction without residual deficits    585.3 Chronic kidney disease, Stage III (moderate)            N18.3    Chronic kidney disease, stage 3 (moderate)

## 2021-05-18 NOTE — PROGRESS NOTES
Denisse Malave 1945     Office/Outpatient Visit    Visit Date: Mon, Apr 22, 2019 04:09 pm    Provider: Geeta Patterson MD (Assistant: Rosa Arguello MA)    Location: [facility], [place]        Electronically signed by Geeta Patterson MD on  04/22/2019 04:49:41 PM                             SUBJECTIVE:        CC:     Ms. Malave is a 73 year old White female.  Patient presents today for three month follow up;         HPI: Denisse is here today for routine f/u on chronic issues.        Her dizzy spells have improved.  She has been doing PT.  Cardiology workup for cardiac etiology of dizziness was negative.  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 She has had many ear surgeries because she has a history of recurrent ear infections.  She describes the dizziness as vertigo.  She was seen by Dr. Christianson earlier this month.  She can't remember going to him.  She notes today that she has been doing a lot better since she started going to PT.  She did have a vertigo spell last night that lasted into this morning.  However, she is doing better too.        She is on metformin, Basaglar, and Humalog (which she uses just once daily in the morning) for DM.  The pharmacy says she has not been filling her metformin, however.  She went off it for about 3 weeks because she ran out but has been back on it now.  She is UTD on eye exam (9/2018).  She is due for foot exam (3/2018).  She is on an ASA, ARB and statin.        She is on amlodipine and losartan for HTN.  BP has been well controlled when she checks it at home.  No CP, palpitations, SOB.        She is on rosuvastatin for HLD.  No myalgias.        She is on Effexor XR for depression. Stable.  No depression or anhedonia.  No anxiety or panic attacks.     ROS:     CONSTITUTIONAL:  Negative for fatigue and fever.      EYES:  Negative for blurred vision.      E/N/T:  Positive for diminished hearing, tinnitus and nasal congestion.   Negative for  "frequent rhinorrhea.      CARDIOVASCULAR:  Negative for chest pain and palpitations.      RESPIRATORY:  Negative for recent cough and dyspnea.      GASTROINTESTINAL:  Negative for abdominal pain, constipation, diarrhea, nausea and vomiting.      MUSCULOSKELETAL:  Positive for arthralgias.   Negative for myalgias.      NEUROLOGICAL:  Positive for ataxia, dizziness, vertigo and weakness ( bilateral lower extremity ).   Negative for paresthesias.      PSYCHIATRIC:  Negative for anxiety, depression and sleep disturbance.          PMH/FMH/SH:     Last Reviewed on 4/22/2019 04:28 PM by Geeta Patterson    Past Medical History:             PREVENTIVE HEALTH MAINTENANCE             BONE DENSITY: was last done 8/15/18 with the following abnormality noted-- osteoporosis     EYE EXAM: Diabetic Eye Exam during this calendar year and results are in chart was last done 9/13/2018     Hepatitis C Medicare Screening: was last done 7/10/18     MAMMOGRAM: Done within last 2 years and results in are chart was last done 8/15/2018 with normal results     PAP SMEAR: No longer indicated due to age and history s/p hysterectomy         PAST MEDICAL HISTORY         Positive for    Hyperlipidemia and    Hypertension;     Positive for    Chronic Renal Failure and    Urinary Incontinence;     Positive for    Type 2 Diabetes: complications include nephropathy and peripheral neuropathy; ;     Positive for    Cerebrovascular Accident;     Positive for    Pernicious Anemia;         CURRENT MEDICAL PROVIDERS:    Ophthalmologist: Dr Shelby         PAST MEDICAL HISTORY             ADVANCE DIRECTIVE Durable Power of   has copy         Surgical History:         Biopsy of breast; benign    Cholecystectomy    Hysterectomy: d/t concern for uterine cancer but this was benign;      rotator cuff repair R shoulder;    \"ear surgery\";         Family History:     Father: Coronary Artery Disease     Mother: Lung Cancer     Brother(s): Coronary Artery " Disease     Sister(s): Neurological/Genetic Myasthenia Gravis     Son(s): Coronary Artery Disease         Social History:     Occupation:    Retired     Marital Status:      Children: 2 children 1 , 1 son Francisco Javier         Tobacco/Alcohol/Supplements:     Last Reviewed on 2019 04:28 PM by Geeta Patterson    Tobacco: She has never smoked.          Substance Abuse History:     Last Reviewed on 2019 04:28 PM by Geeta Patterson        Mental Health History:     Last Reviewed on 2019 04:28 PM by Geeta Patterson        Communicable Diseases (eg STDs):     Last Reviewed on 2019 04:28 PM by Geeta Patterson            Current Problems:     Last Reviewed on 3/13/2019 11:38 AM by Geeta Patterson    Vertigo     Osteoporosis, other     Postmenopausal osteoporosis     Fall NOS     Hyperlipidemia     Hypertension     Type 2 diabetes     B12 deficiency     Chronic kidney disease, Stage III (moderate)     Personal history of transient ischemic attack (TIA), and cerebral infarction 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         Current Medications:     Last Reviewed on 2019 03:29 PM by Rosa Arguello    Effexor XR 75mg Capsules, Extended Release Take 3 capsule(s) by mouth daily     Crestor 10mg Tablet 1 tab daily     Oxybutynin Chloride 5mg Tablet 1 tab daily     Losartan 100mg Tablet Take 1 tablet(s) by mouth daily     Accu-Chek Mary Plus Test Strips  Reagent Strips Test Blood Sugar qid DX E11.9     [place] (general)  Needle 31g 5mm pen needles, use as directed     Metformin HCl 1,000mg Tablet 1 tab daily     Amlodipine  5mg Tablet 1 tab daily     Basaglar KwikPen 100units/1ml Injection 75 units SC QAM (Patient Assistance)     Humalog KwikPen 100units/1ml Pen System, Disposable  Use as directed per sliding scale     Metoprolol Succinate 50mg Tablets, Extended Release one tablet po q hs         OBJECTIVE:        Vitals:         Current: 4/22/2019 4:11:07 PM    Ht:  5 ft, 3.5 in;  Wt: 171.6 lbs;  BMI: 29.9    T: 97.9 F (oral);  BP: 143/63 mm Hg (right arm, sitting);  P: 67 bpm (right arm (BP Cuff), sitting);  sCr: 1.03 mg/dL;  GFR: 51.91        Exams:     PHYSICAL EXAM:     GENERAL: vital signs recorded - well developed, well nourished;  well groomed;  no apparent distress;     EYES: extraocular movements intact; conjunctiva and cornea are normal; PERRLA;     E/N/T: OROPHARYNX:  normal mucosa, dentition, gingiva, and posterior pharynx;     RESPIRATORY: normal respiratory rate and pattern with no distress; normal breath sounds with no rales, rhonchi, wheezes or rubs;     CARDIOVASCULAR: normal rate; rhythm is regular;  no systolic murmur; no edema;     GASTROINTESTINAL: nontender; normal bowel sounds;     MUSCULOSKELETAL: normal gait; normal overall tone     PSYCHIATRIC: appropriate affect and demeanor; normal psychomotor function; normal speech pattern;     Left foot exam    Protective sensation using Monofilament test: NORMAL sensation. Patient detects .07 grams of force which is considered normal.    Vascular status: normal peripheral vascular exam with palpable dorsal pedal and posterior tibal pulses and brisk digital capillary refill    Skin is intact without sores or ulcers    Right foot exam    Protective sensation using Monofilament test: NORMAL sensation. Patient detects .07 grams of force which is considered normal.    Vascular status: normal peripheral vascular exam with palpable dorsal pedal and posterior tibal pulses and brisk digital capillary refill    Skin is intact without sores or ulcers         ASSESSMENT           250.00   E11.9  Type 2 diabetes              DDx:     401.1   I10  Hypertension              DDx:     272.4   E78.2  Hyperlipidemia              DDx:     E888.9    W19.XXXA  Fall NOS              DDx:     300.02   F41.9  Anxiety              DDx:         ORDERS:         Meds Prescribed:       Refill of: Effexor XR (Venlafaxine HCl) 75mg Capsules, Extended Release Take 3 capsule(s) by mouth daily  #270 (Two [place] and Seventy) capsule(s) Refills: 1       Refill of: Crestor (Rosuvastatin) 10mg Tablet 1 tab daily  #90 (Ninety) tablet(s) Refills: 1       Refill of: Oxybutynin Chloride 5mg Tablet 1 tab daily  #90 (Ninety) tablet(s) Refills: 1       Refill of: Metoprolol Succinate 50mg Tablets, Extended Release one tablet po q hs  #90 (Ninety) tablet(s) Refills: 1         Radiology/Test Orders:       3014F  Screening mammography results documented and reviewed (PV)1  (In-House)         2022F  Dilated retinal eye exam w/interpret by ophthalmologist/optometrist documented/reviewed (DM)4  (In-House)           Lab Orders:       APPTO  Appointment need  (In-House)           Other Orders:       2028F  Foot examination performed (includes examination through visual inspection, sensory exam with monofi  (In-House)                   PLAN:          Type 2 diabetes She is on metformin, Basaglar, and Humalog (which she uses just once daily in the morning) for DM.  The pharmacy says she has not been filling her metformin, however.  She went off of it for about 3 weeks because she ran out but has been back on it.  She is UTD on eye exam (9/2018).  Foot exam today was normal.  She is on an ASA, ARB and statin.  RTC 3 months for AWV.     MIPS Vaccines Flu and Pneumonia updated in Shot record    DIABETIC EYE EXAM: Diabetic Eye Exam during this calendar year and results are in chart     MAMMOGRAM: Done within last 2 years and results in are chart     FOLLOW-UP: Schedule a follow-up visit in 3 months..  Medicare wellness 30 min with Yesenia           Orders:       APPTO  Appointment need  (In-House)         3014F  Screening mammography results documented and reviewed (PV)1  (In-House)         2022F   Dilated retinal eye exam w/interpret by ophthalmologist/optometrist documented/reviewed (DM)4  (In-House)            Hypertension BP at goal.  Labs reviewed and UTD.  Refills sent.           Prescriptions:       Refill of: Metoprolol Succinate 50mg Tablets, Extended Release one tablet po q hs  #90 (Ninety) tablet(s) Refills: 1          Hyperlipidemia Refills sent.  Labs reviewed and UTD.           Prescriptions:       Refill of: Crestor (Rosuvastatin) 10mg Tablet 1 tab daily  #90 (Ninety) tablet(s) Refills: 1          Fall NOS Dizziness seems to be improving.  I need to get Dr. Christianson's note to see what his recommendations were.  However, PT really seems to be helping Denisse.  She has only had one bad vertigo spell since restarting.          Anxiety Refills sent.  Discussed whether we should take her down on the dose, but she is hesitant to do this.  Depression has worsened in the past when she did this.           Prescriptions:       Refill of: Effexor XR (Venlafaxine HCl) 75mg Capsules, Extended Release Take 3 capsule(s) by mouth daily  #270 (Two [place] and Seventy) capsule(s) Refills: 1             Other Prescriptions:       Refill of: Oxybutynin Chloride 5mg Tablet 1 tab daily  #90 (Ninety) tablet(s) Refills: 1         Other Orders:       2028F  Foot examination performed (includes examination through visual inspection, sensory exam with monofi  (In-House)           Patient Recommendations:        For  Type 2 diabetes:     Schedule a follow-up visit in 3 months.                APPOINTMENT INFORMATION:        Monday Tuesday Wednesday Thursday Friday Saturday Sunday            Time:___________________AM  PM   Date:_____________________             CHARGE CAPTURE           **Please note: ICD descriptions below are intended for billing purposes only and may not represent clinical diagnoses**        Primary Diagnosis:         250.00 Type 2 diabetes            E11.9    Type 2 diabetes mellitus without  complications              Orders:          12124   Office/outpatient visit; established patient, level 4  (In-House)             APPTO   Appointment need  (In-House)             3014F   Screening mammography results documented and reviewed (PV)1  (In-House)             2022F   Dilated retinal eye exam w/interpret by ophthalmologist/optometrist documented/reviewed (DM)4  (In-House)           401.1 Hypertension            I10    Essential (primary) hypertension    272.4 Hyperlipidemia            E78.2    Mixed hyperlipidemia    E888.9 Fall NOS            W19.XXXA    Unspecified fall, initial encounter    300.02 Anxiety            F41.9    Anxiety disorder, unspecified        Other Orders:           2028F   Foot examination performed (includes examination through visual inspection, sensory exam with monofi  (In-House)           ADDENDUMS:      ____________________________________    Addendum: 05/03/2019 02:07 PM - Sharon Stinson         Visit Note Faxed to:        User Entered Recipient; Number [phone]            Addendum: 06/12/2019 10:02 AM - Sharon Stinson tests her blood sugar levels four times daily.        Addendum: 06/12/2019 10:03 AM - Sharon Stinson         Visit Note Faxed to:        User Entered Recipient; Number [phone]            Addendum: 06/12/2019 10:04 AM - Sharon Stinson         Visit Note Faxed to:        User Entered Recipient; Number [phone]

## 2021-05-18 NOTE — PROGRESS NOTES
Denisse Malave  1945     Office/Outpatient Visit    Visit Date: Fri, Apr 17, 2020 09:44 am    Provider: Geeta Patterson MD (Assistant: Sharon Stinson RN)    Location: [facility]        Electronically signed by Geeta Patterson MD on  04/17/2020 11:54:13 AM                             Subjective:        CC: consent for telehealth/jm, c/o HA, head congestion, stuffy/runny nose, ear ache, no fever, tried Coricidin HBP    HPI: Denisse's telehealth visit today is for acute complaints of URI for the past 2 weeks.  +Fatigue and malaise, no fevers or chills.  +Rhinorrhea, congestion.  +Bilateral ear pain.   No sore throat.  +Cough, minimal production.  No CP or SOB.  No abd pain, N/V/D.  +Body aches.  Sick contacts: none known.  She has tried Coricidin Cold and Flu with no relief.    ROS:     CONSTITUTIONAL:  Positive for fatigue and malaise.   Negative for chills or fever.      EYES:  Negative for blurred vision.      E/N/T:  Positive for ear pain, nasal congestion, frequent rhinorrhea and sinus pressure.   Negative for diminished hearing or sore throat.      CARDIOVASCULAR:  Negative for chest pain.      RESPIRATORY:  Negative for recent cough, dyspnea, pleuritic chest pain and frequent wheezing.      GASTROINTESTINAL:  Negative for abdominal pain, diarrhea, nausea and vomiting.      MUSCULOSKELETAL:  Negative for myalgias.      NEUROLOGICAL:  Positive for headaches.          Past Medical History / Family History / Social History:         Last Reviewed on 4/17/2020 11:40 AM by Geeta Patterson    Past Medical History:             PREVENTIVE HEALTH MAINTENANCE             BONE DENSITY: was last done 8/15/18 with the following abnormality noted-- osteoporosis     COLORECTAL CANCER SCREENING: declines colorectal cancer screening, understands reason for testing     EYE EXAM: Diabetic Eye Exam during this calendar year and results are in chart was last done 9/17/19 The next one is due  6 months  "mild/moderate diabetic retinopathy     Hepatitis C Medicare Screening: was last done 7/10/18     MAMMOGRAM: Done within last 2 years and results in are chart was last done 2019 with normal results     PAP SMEAR: No longer indicated due to age and history s/p hysterectomy         PAST MEDICAL HISTORY         Positive for    Hyperlipidemia and    Hypertension;     Positive for    Chronic Renal Failure and    Urinary Incontinence;     Positive for    Type 2 Diabetes: complications include nephropathy and peripheral neuropathy; ;     Positive for    Cerebrovascular Accident;     Positive for    Pernicious Anemia;         CURRENT MEDICAL PROVIDERS:    Cardiologist: Dr Munroe    Endocrinologist: Ira France NP    E/N/T: Dr Christianson    Ophthalmologist: Dr Shelby             ADVANCE DIRECTIVES: Durable Power of   has copy         Surgical History:         Biopsy of breast; benign    Cholecystectomy    Hysterectomy: d/t concern for uterine cancer but this was benign;     rotator cuff repair R shoulder;    \"ear surgery\";         Family History:     Father: Coronary Artery Disease     Mother: Lung Cancer     Brother(s): Coronary Artery Disease     Sister(s): Neurological/Genetic Myasthenia Gravis     Son(s): Coronary Artery Disease         Social History:     Occupation:    Retired     Marital Status:      Children: 2 children 1 , 1 son Francisco Javier         Tobacco/Alcohol/Supplements:     Last Reviewed on 2020 11:40 AM by Geeta Patterson    Tobacco: She has never smoked.          Substance Abuse History:     Last Reviewed on 2020 11:40 AM by Geeta Patterson        Mental Health History:     Last Reviewed on 2020 11:40 AM by Geeta Patterson        Communicable Diseases (eg STDs):     Last Reviewed on 2020 11:40 AM by Geeta Patterson        Current Problems:     Last Reviewed on 2020 05:03 AM by Elmer Valentin    Vitamin B12 defic anemia due to intrinsic factor deficiency    " CKD - Chronic kidney disease, stage 3 (moderate)    H/o stroke - Personal history of transient ischemic attack (TIA), and cerebral infarction without residual deficits    HLD - Mixed hyperlipidemia    HTN - Essential (primary) hypertension    Other osteoporosis without current pathological fracture    Benign paroxysmal vertigo, left ear    Anxiety disorder, unspecified    Type 2 diabetes mellitus with hyperglycemia    Repeated falls    Encounter for screening for depression    Muscle weakness (generalized)    Unspecified injury of right elbow, initial encounter    Age-related cognitive decline    Postconcussional syndrome    Unspecified injury of head, initial encounter    Intercostal pain        Immunizations:     Prevnar 13 (Pneumococcal PCV 13) 7/18/2018    Pneumococcal, 23-valent IM/SC (adult and pt >=2yr) 10/31/2019        Allergies:     Last Reviewed on 2/26/2020 05:03 AM by Elmer Valentin    No Known Allergies.        Current Medications:     Last Reviewed on 2/26/2020 05:03 AM by Elmer Valentin    Crestor 10mg Tablet [1 tab daily]    Metoprolol Succinate 50 mg oral Tablet, Extended Release 24 hr [one tablet po q hs]    Oxybutynin Chloride 5mg Tablet [1 tab daily]    Amlodipine  5 mg oral tablet [1 tab daily]    Losartan 100 mg oral tablet [Take 1 tablet(s) by mouth daily]    metFORMIN 1,000 mg oral tablet [1 tab daily]    HumaLOG KwikPen Insulin 100 unit/mL subcutaneous Insulin Pen [20-30 units based on carbs at each meal (patient assistance)]    Basaglar KwikPen U-100 Insulin 100 unit/mL (3 mL) subcutaneous Insulin Pen [75 units QAM (Patient Assistance)]    Needles (general)  Needle [31g 5mm pen needles, use as directed]    Accu-Chek Mary Plus Test Strips  Reagent Strips [Test Blood Sugar qid DX E11.9]    alendronate 70 mg oral tablet [once a week]    venlafaxine 75 mg oral tablet [take 3 tablets by oral route once daily with food]        Assessment:         J01.00   Acute maxillary sinusitis, unspecified            ORDERS:         Meds Prescribed:       [New Rx] Augmentin 875-125 mg oral tablet [take 1 tablet by oral route every 12 hours for 10 days], #20 (twenty) tablets, Refills: 0 (zero)       [New Rx] guaiFENesin 1,200 mg oral Tablet,Extended Release 12 hr [1 tab PO BID prn cough/congestion], #30 (thirty) tablets, Refills: 0 (zero)         Radiology/Test Orders:       2022F  Dilated retinal eye exam w/interpret by ophthalmologist/optometrist documented/reviewed (DM)4  (In-House)              Other Orders:         Screening mammogram results documented  (Send-Out)            1124F  Advance Care Planning discussed and doc in MR; no surrogate named or advance care plan provided  (Send-Out)                      Plan:         Acute maxillary sinusitis, unspecifiedTreating bacterial sinusitis due to sx duration of 2 weeks.  No cough or significant lower respiratory sx so COVID-19 is low concern at this time.  Symptomatic care recommended with OTC analgesics for pain/fever, OTC decongestants and cough suppressants prn.  Stay well hydrated.  Humidifier in the bedroom at night.  Hot tea with lemon and honey.  RTC prn worsening or failure to improve of sx.  She has a f/u appt already scheduled with me for next week.    MIPS Vaccines Flu and Pneumonia updated in Shot record Screening mammomgram done within last 2 years and results in are chart Diabetic Eye Exam during this calendar year and results are in chart Advance Directive/Surrogate Decision Maker discussed and pt declines to complete today Telehealth: Verbal consent obtained for visit to occur via phone call; Staff, other than provider, present during telephone visit include Sharon WELSH; Total time spent was 7 minutes; [id number]--Pztooobkn E/M 5-10 minutes           Prescriptions:       [New Rx] Augmentin 875-125 mg oral tablet [take 1 tablet by oral route every 12 hours for 10 days], #20 (twenty) tablets, Refills: 0 (zero)       [New Rx] guaiFENesin 1,200 mg oral  Tablet,Extended Release 12 hr [1 tab PO BID prn cough/congestion], #30 (thirty) tablets, Refills: 0 (zero)           Orders:         Screening mammogram results documented  (Send-Out)            2022F  Dilated retinal eye exam w/interpret by ophthalmologist/optometrist documented/reviewed (DM)4  (In-House)            1124F  Advance Care Planning discussed and doc in MR; no surrogate named or advance care plan provided  (Send-Out)                  Charge Capture:         Primary Diagnosis:     J01.00  Acute maxillary sinusitis, unspecified           Orders:      [id number]  Phys/QHP telephone evaluation 5-10 min  (In-House)            2022F  Dilated retinal eye exam w/interpret by ophthalmologist/optometrist documented/reviewed (DM)4  (In-House)

## 2021-05-18 NOTE — PROGRESS NOTES
Denisse Malave 1945     Office/Outpatient Visit    Visit Date: Mon, Jan 7, 2019 10:48 am    Provider: Geeta Patterson MD (Assistant: Rosa Arguello MA)    Location: [facility]        Electronically signed by Geeta Patterson MD on  01/07/2019 11:39:12 AM                             SUBJECTIVE:        CC:     Ms. Malave is a 73 year old White female.  presents today due to complaints of leg pain and possible infection on lower right leg after having skin graft two months ago         HPI: Denisse called clinic earlier today with complaint of R lower leg pain and redness in the area of a skin graft done 2 months ago following skin cancer removal.  Pain in the leg started 2 days ago.  +Swelling in the anterior shin over the area of the skin graft.  No drainage.  The whole leg hurts, all the way up to the R medial knee.  Pain is a soreness.     ROS:     CONSTITUTIONAL:  Negative for chills and fever.      CARDIOVASCULAR:  Negative for chest pain and palpitations.      RESPIRATORY:  Negative for recent cough and dyspnea.      GASTROINTESTINAL:  Positive for nausea.   Negative for vomiting.      MUSCULOSKELETAL:  Positive for arthralgias and (Left elbow) limb pain ( right leg pain ).   Negative for myalgias.      INTEGUMENTARY/BREAST:  Positive for rash redness and pain RLE over area of skin graft.   Negative for atypical mole(s).          PMH/FMH/SH:     Last Reviewed on 1/07/2019 11:24 AM by Geeta Patterson    Past Medical History:             PREVENTIVE HEALTH MAINTENANCE             BONE DENSITY: was last done 8/15/18 with the following abnormality noted-- osteoporosis     EYE EXAM: Diabetic Eye Exam during this calendar year and results are in chart was last done 9/13/2018     Hepatitis C Medicare Screening: was last done 7/10/18     MAMMOGRAM: Done within last 2 years and results in are chart was last done 8/15/2018 with normal results     PAP SMEAR: No longer indicated due to age and history s/p  "hysterectomy         PAST MEDICAL HISTORY         Positive for    Hyperlipidemia and    Hypertension;     Positive for    Chronic Renal Failure and    Urinary Incontinence;     Positive for    Type 2 Diabetes: complications include nephropathy and peripheral neuropathy; ;     Positive for    Cerebrovascular Accident;     Positive for    Pernicious Anemia;         CURRENT MEDICAL PROVIDERS:    Ophthalmologist: Dr Shelby         PAST MEDICAL HISTORY             ADVANCE DIRECTIVE Durable Power of   has copy         Surgical History:         Biopsy of breast; benign    Cholecystectomy    Hysterectomy: d/t concern for uterine cancer but this was benign;      rotator cuff repair R shoulder;    \"ear surgery\";         Family History:     Father: Coronary Artery Disease     Mother: Lung Cancer     Brother(s): Coronary Artery Disease     Sister(s): Neurological/Genetic Myasthenia Gravis     Son(s): Coronary Artery Disease         Social History:     Occupation:    Retired     Marital Status:      Children: 2 children 1 , 1 son Francisco Javier         Tobacco/Alcohol/Supplements:     Last Reviewed on 2019 11:24 AM by Geeta Patterson    Tobacco: She has never smoked.          Substance Abuse History:     Last Reviewed on 2019 11:24 AM by Geeta Patterson        Mental Health History:     Last Reviewed on 2019 11:24 AM by Geeta Patterson        Communicable Diseases (eg STDs):     Last Reviewed on 2019 11:24 AM by Geeta Patterson            Current Problems:     Last Reviewed on 10/18/2018 10:47 AM by Geeta Patterson    Vertigo     Osteoporosis, other     Postmenopausal osteoporosis     Fall NOS     Hyperlipidemia     Hypertension     Type 2 diabetes     B12 deficiency     Chronic kidney disease, Stage III (moderate)     Personal history of transient ischemic attack (TIA), and cerebral infarction without residual deficits     Other specified administrative purpose         Immunizations:     " Prevnar 13 (Pneumococcal PCV 13) 7/18/2018         Allergies:     Last Reviewed on 10/18/2018 10:47 AM by Geeta Patterson      No Known Drug Allergies.         Current Medications:     Last Reviewed on 10/18/2018 10:47 AM by Geeta Patterson    Effexor XR 75mg Capsules, Extended Release Take 3 capsule(s) by mouth daily     Crestor 10mg Tablet 1 tab daily     Oxybutynin Chloride 5mg Tablet 1 tab daily     Amlodipine  5mg Tablet 1 tab daily     Losartan 100mg Tablet Take 1 tablet(s) by mouth daily     Accu-Chek Mary Plus Test Strips  Reagent Strips Test Blood Sugar qid DX E11.9     [place] (general)  Needle 31g 5mm pen needles, use as directed     Metformin HCl 1,000mg Tablet 1 tab daily     Basaglar KwikPen 100units/1ml Injection 65 units SC QAM (Patient Assistance)     Humalog KwikPen 100units/1ml Pen System, Disposable Use as directed per sliding scale     Metoprolol Succinate 50mg Tablets, Extended Release one tablet po q hs     Mupirocin 2% Topical Ointment Apply small amount to affected area bid         OBJECTIVE:        Vitals:         Current: 1/7/2019 10:53:23 AM    Ht:  5 ft, 3.5 in;  Wt: 174.2 lbs;  BMI: 30.4    T: 98.4 F (oral);  BP: 166/67 mm Hg (right arm, sitting);  P: 66 bpm (left arm (BP Cuff), sitting);  sCr: 0.98 mg/dL;  GFR: 54.91        Exams:     PHYSICAL EXAM:     GENERAL: vital signs recorded - well developed, well nourished;  well groomed;  no apparent distress;     RESPIRATORY: normal respiratory rate and pattern with no distress; normal breath sounds with no rales, rhonchi, wheezes or rubs;     CARDIOVASCULAR: normal rate; rhythm is regular;  no systolic murmur;   RLE edema 2+ edema;     BREAST/INTEGUMENT: a rash is noted R anterior shin;  the color is mainly red;  it is best characterized as papular and vesicular;     MUSCULOSKELETAL: normal gait; normal overall tone         ASSESSMENT           729.81   R60.9   R22.41  Leg swelling              DDx:         ORDERS:         Meds Prescribed:        Doxycycline Monohydrate 100mg Capsules Take 1 capsule(s) by mouth bid x10 days  #20 (Twenty) capsule(s) Refills: 0         Radiology/Test Orders:       [id number]  Duplex scan of extremity veins w/responses to compression and other maneuvers; unilateral or limited  (Send-Out)         [id number]  Venous doppler right extremity  (Send-Out)         3014F  Screening mammography results documented and reviewed (PV)1  (In-House)         2022F  Dilated retinal eye exam w/interpret by ophthalmologist/optometrist documented/reviewed (DM)4  (In-House)           Other Orders:         Calculated BMI above the upper parameter and a follow-up plan was documented in the medical record  (In-House)                   PLAN:          Leg swelling RLE pain, swelling and erythema.  Treating empirically with doxycycline for possible cellulitis but also there is some concern for possible DVT.  Obtaining LE Doppler as below.     MIPS Vaccines Flu and Pneumonia updated in Shot record    DIABETIC EYE EXAM: Diabetic Eye Exam during this calendar year and results are in chart     MAMMOGRAM: Done within last 2 years and results in are chart     BMI Elevated - Follow-Up Plan: She was provided education on weight loss strategies           Prescriptions:       Doxycycline Monohydrate 100mg Capsules Take 1 capsule(s) by mouth bid x10 days  #20 (Twenty) capsule(s) Refills: 0           Orders:       [id number]  Duplex scan of extremity veins w/responses to compression and other maneuvers; unilateral or limited  (Send-Out)         [id number]  Venous doppler right extremity  (Send-Out)         3014F  Screening mammography results documented and reviewed (PV)1  (In-House)           Calculated BMI above the upper parameter and a follow-up plan was documented in the medical record  (In-House)         2022F  Dilated retinal eye exam w/interpret by ophthalmologist/optometrist documented/reviewed (DM)4  (In-House)             Patient Education Handouts:        [facility] [place] [facility] Compliance              CHARGE CAPTURE           **Please note: ICD descriptions below are intended for billing purposes only and may not represent clinical diagnoses**        Primary Diagnosis:         729.81 Leg swelling            R60.9    Edema, unspecified           R22.41    Localized swelling, mass and lump, right lower limb              Orders:          [id number]   Office/outpatient visit; established patient, level 3  (In-House)             3014F   Screening mammography results documented and reviewed (PV)1  (In-House)                Calculated BMI above the upper parameter and a follow-up plan was documented in the medical record  (In-House)             2022F   Dilated retinal eye exam w/interpret by ophthalmologist/optometrist documented/reviewed (DM)4  (In-House)

## 2021-05-18 NOTE — PROGRESS NOTES
"Denisse Malave. 1945     Office/Outpatient Visit    Visit Date: Tue, Jul 10, 2018 10:46 am    Provider: Geeta Patterson MD (Assistant: Windy Gutierres RN)    Location: [facility]        Electronically signed by Geeta Patterson MD on  07/10/2018 11:33:46 AM                             SUBJECTIVE:        CC:     Ms. Malave is a 73 year old White female.  Edema in bilateral lower extremities (PT ONLY TAKES METFORMIN ONCE A DAY);         HPI: Denisse is here today with acute complain of swelling in the bilateral legs.  She is coming in next week for a scheduled AWV so will defer other issues till then.        She was seen by Dr. Thomas yesterday for f/u of skin cancer removal on the R anterior doherty.  Dr. Thomas was reportedly concerned about the amount of swelling and redness in Denisse's leg.  Swelling started 4-5 days ago, R > L.  \"My feet have never looked like this before.\"  On further questioning, the swelling is really just limited to the feet and has not really involved the shins.  No pain in the legs.  No problems with cardiac issues in the past.  However, she has a strong family history (multiple heart attacks and CAD including her son who  of this at age 49).  She does report dyspnea, worse with exertion, for the past 3-4 months.  No CP or palpitations, no orthopnea or PND.  +Dizziness \"all the time.\"     ROS:     CONSTITUTIONAL:  Negative for fatigue and fever.      CARDIOVASCULAR:  Positive for dizziness and pedal edema ( moderate ).   Negative for chest pain, orthopnea, palpitations, paroxysmal nocturnal dyspnea or tachycardia.      RESPIRATORY:  Positive for dyspnea.   Negative for recent cough or frequent wheezing.      GASTROINTESTINAL:  Negative for abdominal pain, constipation, diarrhea, nausea and vomiting.      MUSCULOSKELETAL:  Negative for arthralgias and myalgias.          PMH/FMH/SH:     Last Reviewed on 7/10/2018 11:04 AM by Geeta Patterson    Past Medical History:         " "    PREVENTIVE HEALTH MAINTENANCE             EYE EXAM: Diabetic Eye Exam during this calendar year and results are in chart was last done 2017     MAMMOGRAM: Done within last 2 years and results in are chart was last done 17 with normal results     PAP SMEAR: Hysterectomy         PAST MEDICAL HISTORY         Positive for    Hyperlipidemia and    Hypertension;     Positive for    Chronic Renal Failure and    Urinary Incontinence;     Positive for    Type 2 Diabetes: complications include nephropathy and peripheral neuropathy; ;     Positive for    Cerebrovascular Accident;     Positive for    Pernicious Anemia;         CURRENT MEDICAL PROVIDERS:    Ophthalmologist: Dr Shelby         Surgical History:         Biopsy of breast; benign    Cholecystectomy    Hysterectomy: d/t concern for uterine cancer but this was benign;      rotator cuff repair R shoulder;    \"ear surgery\";         Family History:     Father: Coronary Artery Disease     Mother: Lung Cancer     Brother(s): Coronary Artery Disease     Sister(s): Neurological/Genetic Myasthenia Gravis     Son(s): Coronary Artery Disease         Social History:     Occupation:    Retired     Marital Status:      Children: 2 children 1 , 1 son Francisco Javier         Tobacco/Alcohol/Supplements:     Last Reviewed on 7/10/2018 11:04 AM by Geeta Patterson    Tobacco: She has never smoked.          Substance Abuse History:     Last Reviewed on 7/10/2018 11:04 AM by Geeta Patterson        Mental Health History:     Last Reviewed on 7/10/2018 11:04 AM by Geeta Patterson        Communicable Diseases (eg STDs):     Last Reviewed on 7/10/2018 11:04 AM by Geeta Patterson            Current Problems:     Last Reviewed on 3/28/2018 09:43 AM by Geeta Patterson    Hyperlipidemia     Hypertension     Type 2 diabetes     B12 deficiency     Chronic kidney disease, Stage III (moderate)     Personal history of transient ischemic attack (TIA), and cerebral infarction without " residual deficits     Other specified administrative purpose         Immunizations:     None        Allergies:     Last Reviewed on 7/10/2018 10:51 AM by Windy Gutierres      No Known Drug Allergies.         Current Medications:     Last Reviewed on 7/10/2018 10:53 AM by Windy Gutierres    Losartan 100mg Tablet Take 1 tablet(s) by mouth daily     Accu-Chek Mary Plus Test Strips  Reagent Strips Test Blood Sugar qid DX E11.9     [place] (general)  Needle 31g 5mm pen needles, use as directed     Amlodipine  5mg Tablet 1 tab daily     Effexor XR 75mg Capsules, Extended Release Take 3 capsule(s) by mouth daily     Oxybutynin Chloride 5mg Tablet 1 tab daily     Crestor 10mg Tablet 1 tab daily     Metformin HCl 1,000mg Tablet 1 tab bid     Basaglar KwikPen 100units/1ml Injection Inject 54 units every am     Humalog KwikPen 100units/1ml Pen System, Disposable Use as directed per sliding scale     Metoprolol Succinate 50mg Tablets, Extended Release one tablet po q hs     Doxycycline  Hyclate 100mg Tablet Take one tablet twice daily for 10 days     Mupirocin 2% Topical Ointment Apply small amount to affected area bid         OBJECTIVE:        Vitals:         Current: 7/10/2018 10:50:11 AM    Ht:  5 ft, 3.5 in;  Wt: 184 lbs;  BMI: 32.1    T: 97.1 F (oral);  BP: 147/78 mm Hg (left arm, sitting);  P: 68 bpm (left arm (BP Cuff), sitting);  sCr: 0.98 mg/dL;  GFR: 56.20        Repeat:     10:58:41 AM     BP:   144/80mm Hg (left arm, sitting)         Exams:     PHYSICAL EXAM:     GENERAL: vital signs recorded - well developed, well nourished;  well groomed;  no apparent distress;     EYES: extraocular movements intact; conjunctiva and cornea are normal; PERRLA;     E/N/T: OROPHARYNX:  normal mucosa, dentition, gingiva, and posterior pharynx;     RESPIRATORY: normal respiratory rate and pattern with no distress; normal breath sounds with no rales, rhonchi, wheezes or rubs;     CARDIOVASCULAR: normal rate; rhythm is regular;  no  systolic murmur; 1+ pedal edema;     GASTROINTESTINAL: nontender; normal bowel sounds;     MUSCULOSKELETAL: normal gait; normal overall tone         Lab/Test Results:         LABORATORY RESULTS: EKG performed by tls         ASSESSMENT           782.3   R60.9  Lower limb edema              DDx:     250.00   E11.9  Type 2 diabetes              DDx:     786.05   R06.00  Dyspnea              DDx:     V73.99   Z11.59  Screening test for viral disease - unspecified              DDx:         ORDERS:         Radiology/Test Orders:       [id number]  12+ -lead ECG tracing w/interp, report  (In-House)           Lab Orders:       [id number]  DIAB2 - [facility] CMP A1C LIPID AND MICRO ALBUM CR RATIO: [id number]  (Send-Out)         91407  NTBNP - [facility] B-Type Natriurectic peptide  (Send-Out)         55334  D-DIM - [facility] D-Dimer  (Send-Out)         87310  TSH - [facility] TSH  (Send-Out)           HPCMS - [facility] Hepatitis C AB (Medicare Screen)  (Send-Out)                   PLAN:          Lower limb edema Pedal edema is actually not that impressive today, so I assume that it looked worse yesterday when Dr. Thomas saw her.  It appears fairly symmetric but Denisse states that in general, the R has been worse.  She has an extensive family history of cardiac disease and multiple risk factors.  Has noted some ongoing SOB.  Checking EKG today.  Also obtaining labs as below to include BNP, d-dimer and CMP.  She is due for diabetic labs ahead of her AWV next week anyway, so will also obtain those.  Keep scheduled appt next week.     LABORATORY:  Labs ordered to be performed today include D-Dimer.      FOLLOW-UP: Keep currently scheduled appointment..            Orders:       [id number]  D-DIM - [facility] D-Dimer  (Send-Out)            Type 2 diabetes Checking labs.     LABORATORY:  Labs ordered to be performed today include Diabetes Panel 2;CMP, A1C, Lipid, Microalbumin:Creatinine Ratio and TSH.            Orders:       [id number]  DIAB2 - [facility] CMP A1C LIPID AND  MICRO ALBUM CR RATIO: [id number],32406,[id number]  (Send-Out)         78110  TSH - [facility] TSH  (Send-Out)             Patient Education Handouts:       [facility] [place]           Dyspnea +MAR x 3-4 months.  Checking EKG and BNP as below.  EKG shows old inferior infarct.  She thinks she has been told about this before.  I am going to track down an old EKG to compare from her prior PCP, Dr. Abbott.  May consider Cardiology consult for f/u.     LABORATORY:  Labs ordered to be performed today include BNP.            Orders:       [id number]  NTBNP - [facility] B-Type Natriurectic peptide  (Send-Out)         50659  12+ -lead ECG tracing w/interp, report  (In-House)            Screening test for viral disease - unspecified     LABORATORY:  Labs ordered to be performed today include Hepatitis C Ab (Medicare Screen).            Orders:         [facility] Hepatitis C AB (Medicare Screen)  (Send-Out)               Patient Recommendations:        For  Lower limb edema:                     APPOINTMENT INFORMATION:        Monday Tuesday Wednesday Thursday Friday Saturday Sunday            Time:___________________AM  PM   Date:_____________________             CHARGE CAPTURE           **Please note: ICD descriptions below are intended for billing purposes only and may not represent clinical diagnoses**        Primary Diagnosis:         782.3 Lower limb edema            R60.9    Edema, unspecified              Orders:          60724   Office/outpatient visit; established patient, level 4  (In-House)           250.00 Type 2 diabetes            E11.9    Type 2 diabetes mellitus without complications    786.05 Dyspnea            R06.00    Dyspnea, unspecified              Orders:          44804   12+ -lead ECG tracing w/interp, report  (In-House)           V73.99 Screening test for viral disease - unspecified            Z11.59    Encounter for screening for other viral diseases        ADDENDUMS:       ____________________________________    Date: 07/12/2018 10:18 AM    Author: Sharon Stinson         Visit Note Faxed to:        Clyde Munroe  (Cardiology); Number [phone]

## 2021-05-18 NOTE — PROGRESS NOTES
Denisse Malave  1945     Office/Outpatient Visit    Visit Date: Thu, Feb 13, 2020 02:24 pm    Provider: Yecenia Cordova N.P. (Assistant: Rosa Arguello MA)    Location: [facility]        Electronically signed by Yecenia Cordova N.P. on  02/13/2020 04:57:22 PM                             Subjective:        CC: Ms. Malave is a 74 year old White female.  Patient presents today with complaints of left rib and right elbow pain after falling lastnight;         HPI: 74-year-old female presenting to clinic status post fall last night.  She states that she had gotten up to get a drink of water and started falling backwards for no apparent reason.  She states she did not lose consciousness.  She is complaining of left rib pain, right elbow pain and posterior head pain. She states that she has had multiple falls over the last couple of months.  She does live alone.  Son is with her at this visit.  He states that she had 2 visitors yesterday and she took daily medication each time, approximately 1 hour apart. She states that she usually uses a pillbox, but 1 of the tops was broken so she has not been using.  She got a new pillbox today and also visited an assisted living facility.     ROS:     CONSTITUTIONAL:  Negative for fatigue and fever.      EYES:  Positive for blurred vision ( bilaterally ).      CARDIOVASCULAR:  Negative for chest pain, dizziness, palpitations and pedal edema.      RESPIRATORY:  Negative for recent cough and dyspnea.      GASTROINTESTINAL:  Negative for abdominal pain.      GENITOURINARY:  Negative for dysuria and urinary incontinence.      MUSCULOSKELETAL:  Positive for limb pain ( right upper extremity pain ) and left ribs.   Negative for arthralgias or myalgias.      NEUROLOGICAL:  Positive for headaches.   Negative for dizziness, fainting, paresthesias, tremor, vertigo or weakness.      PSYCHIATRIC:  Negative for anxiety, depression and sleep disturbance.          Past  "Medical History / Family History / Social History:         Last Reviewed on 2020 09:07 AM by Geeta Patterson    Past Medical History:             PREVENTIVE HEALTH MAINTENANCE             BONE DENSITY: was last done 8/15/18 with the following abnormality noted-- osteoporosis     COLORECTAL CANCER SCREENING: declines colorectal cancer screening, understands reason for testing     EYE EXAM: Diabetic Eye Exam during this calendar year and results are in chart was last done 19 The next one is due  6 months mild/moderate diabetic retinopathy     Hepatitis C Medicare Screening: was last done 7/10/18     MAMMOGRAM: Done within last 2 years and results in are chart was last done 2019 with normal results     PAP SMEAR: No longer indicated due to age and history s/p hysterectomy         PAST MEDICAL HISTORY         Positive for    Hyperlipidemia and    Hypertension;     Positive for    Chronic Renal Failure and    Urinary Incontinence;     Positive for    Type 2 Diabetes: complications include nephropathy and peripheral neuropathy; ;     Positive for    Cerebrovascular Accident;     Positive for    Pernicious Anemia;         CURRENT MEDICAL PROVIDERS:    Cardiologist: Dr Munroe    Endocrinologist: Ira France NP    E/N/T: Dr Christianson    Ophthalmologist: Dr Shelby             ADVANCE DIRECTIVES: Durable Power of   has copy         Surgical History:         Biopsy of breast; benign    Cholecystectomy    Hysterectomy: d/t concern for uterine cancer but this was benign;     rotator cuff repair R shoulder;    \"ear surgery\";         Family History:     Father: Coronary Artery Disease     Mother: Lung Cancer     Brother(s): Coronary Artery Disease     Sister(s): Neurological/Genetic Myasthenia Gravis     Son(s): Coronary Artery Disease         Social History:     Occupation:    Retired     Marital Status:      Children: 2 children 1 , 1 son Francisco Javier         Tobacco/Alcohol/Supplements:     " Last Reviewed on 1/23/2020 09:07 AM by Geeta Patterson    Tobacco: She has never smoked.          Substance Abuse History:     Last Reviewed on 1/23/2020 09:07 AM by Geeta Patterson        Mental Health History:     Last Reviewed on 1/23/2020 09:07 AM by Geeta Patterson        Communicable Diseases (eg STDs):     Last Reviewed on 1/23/2020 09:07 AM by Geeta Patterson        Immunizations:     Prevnar 13 (Pneumococcal PCV 13) 7/18/2018    Pneumococcal, 23-valent IM/SC (adult and pt >=2yr) 10/31/2019        Allergies:     Last Reviewed on 1/23/2020 09:07 AM by Geeta Patterson    No Known Allergies.        Current Medications:     Last Reviewed on 1/23/2020 09:07 AM by Geeta Patterson    Crestor 10mg Tablet [1 tab daily]    Oxybutynin Chloride 5mg Tablet [1 tab daily]    Metoprolol Succinate 50 mg oral Tablet, Extended Release 24 hr [one tablet po q hs]    Amlodipine  5 mg oral tablet [1 tab daily]    Losartan 100 mg oral tablet [Take 1 tablet(s) by mouth daily]    metFORMIN 1,000 mg oral tablet [1 tab daily]    HumaLOG KwikPen Insulin 100 unit/mL subcutaneous Insulin Pen [20-30 units based on carbs at each meal (patient assistance)]    Basaglar KwikPen U-100 Insulin 100 unit/mL (3 mL) subcutaneous Insulin Pen [75 units QAM (Patient Assistance)]    Needles (general)  Needle [31g 5mm pen needles, use as directed]    Accu-Chek Mary Plus Test Strips  Reagent Strips [Test Blood Sugar qid DX E11.9]    alendronate 70 mg oral tablet [once a week]    venlafaxine 75 mg oral tablet [take 3 tablets by oral route once daily with food]        Objective:        Vitals:         Current: 2/13/2020 2:31:37 PM    Ht:  5 ft, 3.5 in;  Wt: 164.8 lbs;  BMI: 28.7T: 97.6 F (oral);  BP: 172/78 mm Hg (left arm, sitting);  P: 80 bpm (left arm (BP Cuff), sitting);  sCr: 0.92 mg/dL;  GFR: 56.32        Repeat:     2:32:29 PM  BP:   176/82mm Hg (right arm, sitting, HR: 75)     Exams:     PHYSICAL EXAM:     GENERAL: vital signs recorded - well  developed, well nourished;  well groomed;  no apparent distress;     EYES: extraocular movements intact; conjunctiva and cornea are normal; PERRLA;     E/N/T: EARS: external auditory canal normal;  both TMs are have fluid behind them;     NECK: range of motion is normal;     RESPIRATORY: normal respiratory rate and pattern with no distress; normal breath sounds with no rales, rhonchi, wheezes or rubs;     CARDIOVASCULAR: normal rate; rhythm is regular;  no systolic murmur; no edema;     LYMPHATIC: no enlargement of cervical or facial nodes;     MUSCULOSKELETAL: gait: slowed;  pain with range of motion in: right elbow extension;  normal overall tone posterior head edema and tenderness;     NEUROLOGIC: mental status: alert; confused; pleasantly confused. memory loss noted regarding recent events.;     PSYCHIATRIC: affect/demeanor: anxious;  normal speech pattern;         Assessment:         W19.XXXA   Unspecified fall, initial encounter       R29.6   Repeated falls       S59.901A   Unspecified injury of right elbow, initial encounter       I10   HTN - Essential (primary) hypertension           ORDERS:         Radiology/Test Orders:       [id number]  Radiologic exam chest 2 views  (Send-Out)            [id number]  Radiologic exam, ribs, left; 3 views  (Send-Out)            [id number]  Right radiologic examination, elbow; complete, minimum of three views  (Send-Out)            88159  Computed tomography, head or brain; without contrast material  (Send-Out)                      Plan:         Unspecified fall, initial encounterf/u with Dr. Tucker next week. Tylenol as needed for pain. xrays and ct ordered. If pt starts to have any confusion, weakness, or any other neurologicla symptom, pt is to go to ED. Son is with patient. Encouraged pt to not be left alone and for son to come to f/u appt with pcp. Both pt and son v/u and had no further questions upon d/c. Will notify them of results.        RADIOLOGY:  I have ordered CXR 2 view  with PA and Left Unilateral Rib 2 view Chest XRAY Left Unilateral Rib, a right elbow x-ray, and a head CT w/out contrast to be done today.            Orders:       [id number]  Radiologic exam chest 2 views  (Send-Out)            55874XH  Radiologic exam, ribs, left; 3 views  (Send-Out)            [id number]  Right radiologic examination, elbow; complete, minimum of three views  (Send-Out)            86663  Computed tomography, head or brain; without contrast material  (Send-Out)              HTN - Essential (primary) hypertensionBlood pressure is elevated today, but patient has not had medication.  She did however take 2 doses of amlodipine/metoprolol/losartan yesterday as per HPI.  Will not make any changes to medication today due to circumstance.  Follow-up with PCP next week.            Patient Recommendations:        For  Unspecified fall, initial encounter:    right elbow x-ray             Charge Capture:         Primary Diagnosis:     W19.XXXA  Unspecified fall, initial encounter           Orders:      11847  Office/outpatient visit; established patient, level 4  (In-House)              R29.6  Repeated falls     S59.901A  Unspecified injury of right elbow, initial encounter     I10  HTN - Essential (primary) hypertension

## 2021-05-18 NOTE — PROGRESS NOTES
Denisse Malave  1945     Office/Outpatient Visit    Visit Date: Wed, Feb 19, 2020 03:38 pm    Provider: Geeta Patterson MD (Assistant: Windy Gutierres RN)    Location: [facility]        Electronically signed by Geeta Patterson MD on  02/19/2020 04:58:30 PM                             Subjective:        CC: Ms. Malave is a 74 year old White female.  1  week follow up;         HPI: Denisse is here today to follow up on a fall she had last week after she accidentally took a second dose of all her medications, including antihypertensives, 1 hour after taking her first dose.  She came in and saw Yecenia who ordered a head CT.  This was negative.  However, Denisse was evaluated by Dr. Valentin on the day she came in for the head CT and could not remember the fall or multiple events occurring since, prompting concerns for likely concussion.  She is here today with her son.    ROS:     CONSTITUTIONAL:  Negative for fatigue and fever.      EYES:  Negative for blurred vision.      E/N/T:  Positive for diminished hearing and nasal congestion.   Negative for frequent rhinorrhea.      CARDIOVASCULAR:  Negative for chest pain and palpitations.      RESPIRATORY:  Negative for recent cough and dyspnea.      GASTROINTESTINAL:  Negative for abdominal pain, constipation, diarrhea, nausea and vomiting.      MUSCULOSKELETAL:  Positive for arthralgias.   Negative for myalgias.      PSYCHIATRIC:  Negative for anxiety, depression and sleep disturbance.          Past Medical History / Family History / Social History:         Last Reviewed on 2/19/2020 03:47 PM by Geeta Patterson    Past Medical History:             PREVENTIVE HEALTH MAINTENANCE             BONE DENSITY: was last done 8/15/18 with the following abnormality noted-- osteoporosis     COLORECTAL CANCER SCREENING: declines colorectal cancer screening, understands reason for testing     EYE EXAM: Diabetic Eye Exam during this calendar year and results are in chart  "was last done 19 The next one is due  6 months mild/moderate diabetic retinopathy     Hepatitis C Medicare Screening: was last done 7/10/18     MAMMOGRAM: Done within last 2 years and results in are chart was last done 2019 with normal results     PAP SMEAR: No longer indicated due to age and history s/p hysterectomy         PAST MEDICAL HISTORY         Positive for    Hyperlipidemia and    Hypertension;     Positive for    Chronic Renal Failure and    Urinary Incontinence;     Positive for    Type 2 Diabetes: complications include nephropathy and peripheral neuropathy; ;     Positive for    Cerebrovascular Accident;     Positive for    Pernicious Anemia;         CURRENT MEDICAL PROVIDERS:    Cardiologist: Dr Munroe    Endocrinologist: Ira France NP    E/N/T: Dr Christianson    Ophthalmologist: Dr Shelby             ADVANCE DIRECTIVES: Durable Power of   has copy         Surgical History:         Biopsy of breast; benign    Cholecystectomy    Hysterectomy: d/t concern for uterine cancer but this was benign;     rotator cuff repair R shoulder;    \"ear surgery\";         Family History:     Father: Coronary Artery Disease     Mother: Lung Cancer     Brother(s): Coronary Artery Disease     Sister(s): Neurological/Genetic Myasthenia Gravis     Son(s): Coronary Artery Disease         Social History:     Occupation:    Retired     Marital Status:      Children: 2 children 1 , 1 son Francisco Javier         Tobacco/Alcohol/Supplements:     Last Reviewed on 2020 03:47 PM by Geeta Patterson    Tobacco: She has never smoked.          Substance Abuse History:     Last Reviewed on 2020 03:47 PM by Geeta Patterson        Mental Health History:     Last Reviewed on 2020 03:47 PM by Geeta Patterson        Communicable Diseases (eg STDs):     Last Reviewed on 2020 03:47 PM by Geeta Patterson        Current Problems:     Last Reviewed on 2020 10:22 AM by Spurling, Sarah C Vitamin " B12 defic anemia due to intrinsic factor deficiency    CKD - Chronic kidney disease, stage 3 (moderate)    H/o stroke - Personal history of transient ischemic attack (TIA), and cerebral infarction without residual deficits    HLD - Mixed hyperlipidemia    HTN - Essential (primary) hypertension    Other osteoporosis without current pathological fracture    Benign paroxysmal vertigo, left ear    Anxiety disorder, unspecified    Type 2 diabetes mellitus with hyperglycemia    Repeated falls    Unspecified fall, initial encounter    Encounter for other administrative examinations    Encounter for screening for depression    Muscle weakness (generalized)    Unspecified injury of right elbow, initial encounter        Immunizations:     Prevnar 13 (Pneumococcal PCV 13) 7/18/2018    Pneumococcal, 23-valent IM/SC (adult and pt >=2yr) 10/31/2019        Allergies:     Last Reviewed on 2/14/2020 10:22 AM by Spurling, Sarah C    No Known Allergies.        Current Medications:     Last Reviewed on 2/14/2020 10:28 AM by Spurling, Sarah C    Crestor 10mg Tablet [1 tab daily]    Metoprolol Succinate 50 mg oral Tablet, Extended Release 24 hr [one tablet po q hs]    Oxybutynin Chloride 5mg Tablet [1 tab daily]    Amlodipine  5 mg oral tablet [1 tab daily]    Losartan 100 mg oral tablet [Take 1 tablet(s) by mouth daily]    metFORMIN 1,000 mg oral tablet [1 tab daily]    HumaLOG KwikPen Insulin 100 unit/mL subcutaneous Insulin Pen [20-30 units based on carbs at each meal (patient assistance)]    Basaglar KwikPen U-100 Insulin 100 unit/mL (3 mL) subcutaneous Insulin Pen [75 units QAM (Patient Assistance)]    Needles (general)  Needle [31g 5mm pen needles, use as directed]    Accu-Chek Mary Plus Test Strips  Reagent Strips [Test Blood Sugar qid DX E11.9]    alendronate 70 mg oral tablet [once a week]    venlafaxine 75 mg oral tablet [take 3 tablets by oral route once daily with food]        Objective:        Vitals:         Current:  2/19/2020 3:43:43 PM    Ht:  5 ft, 3.5 in;  Wt: 168.2 lbs;  BMI: 29.3T: 98.4 F (oral);  BP: 170/64 mm Hg (left arm, sitting);  P: 70 bpm (left arm (BP Cuff), sitting);  sCr: 0.92 mg/dL;  GFR: 56.81        Repeat:     4:17:49 PM  BP:   185/76mm Hg (left arm, sitting, HR: 90)     Exams:     PHYSICAL EXAM:     GENERAL: vital signs recorded - well developed, well nourished;  well groomed;  no apparent distress;     EYES: extraocular movements intact; conjunctiva and cornea are normal; PERRLA;     E/N/T: OROPHARYNX:  normal mucosa, dentition, gingiva, and posterior pharynx;     RESPIRATORY: normal respiratory rate and pattern with no distress; normal breath sounds with no rales, rhonchi, wheezes or rubs;     CARDIOVASCULAR: normal rate; rhythm is regular;  no systolic murmur; no edema;     GASTROINTESTINAL: nontender; normal bowel sounds;     MUSCULOSKELETAL: normal gait; normal overall tone     PSYCHIATRIC: appropriate affect and demeanor; normal psychomotor function; normal speech pattern;         Assessment:         F07.81   Postconcussional syndrome       R29.6   Repeated falls       R41.81   Age-related cognitive decline           ORDERS:         Procedures Ordered:       REFER  Referral to Specialist or Other Facility  (Send-Out)                      Plan:         Postconcussional syndromeSeems to be improving.  Her son Francisco Javier actually thinks that her mental state has cleared since he started going over there regularly to help with her insulin and monitoring her BG.  Her BGs have been improving too, 100-120 over the past 2 weeks.  We are going to go ahead and proceed with a referral to Neuro for the memory loss as this predates her concussive syndrome, but we may be able to cancel the appt if she is continuing to improve when she makes it back here for her regularly scheduled f/u.          RTC as scheduled in April.         Repeated fallsAmaury get home health PT out to see her for strength training.        Age-related  cognitive declineAs per postconcussive syndrome.        REFERRALS:  Referral initiated to a neurologist ( Dr. Clyde Avalos [facility]; for evaluation of memory loss ).            Orders:       REFER  Referral to Specialist or Other Facility  (Send-Out)                  Charge Capture:         Primary Diagnosis:     F07.81  Postconcussional syndrome           Orders:      [id number]  Office/outpatient visit; established patient, level 4  (In-House)              R29.6  Repeated falls     R41.81  Age-related cognitive decline

## 2021-05-18 NOTE — PROGRESS NOTES
Denisse Malave  1945     Office/Outpatient Visit    Visit Date: Fri, Feb 14, 2020 10:22 am    Provider: Elmer Valentin MD (Assistant: Spurling, Sarah C, MA)    Location: [facility]        Electronically signed by Elmer Valentin MD on  02/26/2020 05:03:36 AM                             Subjective:        CC: Ms. Malave is a 74 year old White female.  Pt is here today for bilateral ear infection.;         HPI: Patient presents to clinic today for complaints of bilateral ear pain.  She was seen in our clinic yesterday after a fall the previous night.  Today, she does not recall much about the fall.  In fact, she was confused about the day of which the fall occurred.  She had imaging performed including a rib x-ray, right elbow x-ray, chest x-ray and a head CT.  All these were unremarkable except for age-related volume loss and chronic small vessel ischemic changes with the CT.  There were no acute findings. It is reported that she has had multiple falls over the last several months.  She cannot recall months of the specifics about any of these falls. Although she presents today for previous complaints of ear pain, in clinic she denies ear pain  She does endorse mildly decreased hearing and congestion. No tinnitus. No fever or chills.    ROS:     CONSTITUTIONAL:  Negative for fatigue and fever.      EYES:  Negative for blurred vision.      E/N/T:  Positive for diminished hearing and nasal congestion.   Negative for frequent rhinorrhea.      CARDIOVASCULAR:  Negative for chest pain and palpitations.      RESPIRATORY:  Negative for recent cough and dyspnea.      GASTROINTESTINAL:  Negative for abdominal pain, constipation, diarrhea, nausea and vomiting.      MUSCULOSKELETAL:  Positive for Left rib pain.      NEUROLOGICAL:  Positive for memory loss and frequent falls.          Past Medical History / Family History / Social History:         Last Reviewed on 2/26/2020 05:03 AM by Elmer Valentin    Past  "Medical History:             PREVENTIVE HEALTH MAINTENANCE             BONE DENSITY: was last done 8/15/18 with the following abnormality noted-- osteoporosis     COLORECTAL CANCER SCREENING: declines colorectal cancer screening, understands reason for testing     EYE EXAM: Diabetic Eye Exam during this calendar year and results are in chart was last done 19 The next one is due  6 months mild/moderate diabetic retinopathy     Hepatitis C Medicare Screening: was last done 7/10/18     MAMMOGRAM: Done within last 2 years and results in are chart was last done 2019 with normal results     PAP SMEAR: No longer indicated due to age and history s/p hysterectomy         PAST MEDICAL HISTORY         Positive for    Hyperlipidemia and    Hypertension;     Positive for    Chronic Renal Failure and    Urinary Incontinence;     Positive for    Type 2 Diabetes: complications include nephropathy and peripheral neuropathy; ;     Positive for    Cerebrovascular Accident;     Positive for    Pernicious Anemia;         CURRENT MEDICAL PROVIDERS:    Cardiologist: Dr Munroe    Endocrinologist: Ira France NP    E/N/T: Dr Christianson    Ophthalmologist: Dr Shelby             ADVANCE DIRECTIVES: Durable Power of   has copy         Surgical History:         Biopsy of breast; benign    Cholecystectomy    Hysterectomy: d/t concern for uterine cancer but this was benign;     rotator cuff repair R shoulder;    \"ear surgery\";         Family History:     Father: Coronary Artery Disease     Mother: Lung Cancer     Brother(s): Coronary Artery Disease     Sister(s): Neurological/Genetic Myasthenia Gravis     Son(s): Coronary Artery Disease         Social History:     Occupation:    Retired     Marital Status:      Children: 2 children 1 , 1 son Francisco Javier         Tobacco/Alcohol/Supplements:     Last Reviewed on 2020 05:03 AM by Elmer Valentin    Tobacco: She has never smoked.          Substance Abuse History:    "  Last Reviewed on 2/26/2020 05:03 AM by Elmer Valentin        Mental Health History:     Last Reviewed on 2/26/2020 05:03 AM by Elmer Valentin        Communicable Diseases (eg STDs):     Last Reviewed on 2/26/2020 05:03 AM by Elmer Valentin        Current Problems:     Last Reviewed on 2/26/2020 05:03 AM by Elmer Valentin    Vitamin B12 defic anemia due to intrinsic factor deficiency    CKD - Chronic kidney disease, stage 3 (moderate)    H/o stroke - Personal history of transient ischemic attack (TIA), and cerebral infarction without residual deficits    HLD - Mixed hyperlipidemia    HTN - Essential (primary) hypertension    Other osteoporosis without current pathological fracture    Benign paroxysmal vertigo, left ear    Anxiety disorder, unspecified    Type 2 diabetes mellitus with hyperglycemia    Repeated falls    Encounter for other administrative examinations    Encounter for screening for depression    Muscle weakness (generalized)    Unspecified injury of right elbow, initial encounter    Age-related cognitive decline    Postconcussional syndrome    Unspecified injury of head, initial encounter    Intercostal pain        Immunizations:     Prevnar 13 (Pneumococcal PCV 13) 7/18/2018    Pneumococcal, 23-valent IM/SC (adult and pt >=2yr) 10/31/2019        Allergies:     Last Reviewed on 2/26/2020 05:03 AM by Elmer Valentin    No Known Allergies.        Current Medications:     Last Reviewed on 2/26/2020 05:03 AM by Elmer Valentin    Crestor 10mg Tablet [1 tab daily]    Metoprolol Succinate 50 mg oral Tablet, Extended Release 24 hr [one tablet po q hs]    Oxybutynin Chloride 5mg Tablet [1 tab daily]    Amlodipine  5 mg oral tablet [1 tab daily]    Losartan 100 mg oral tablet [Take 1 tablet(s) by mouth daily]    metFORMIN 1,000 mg oral tablet [1 tab daily]    HumaLOG KwikPen Insulin 100 unit/mL subcutaneous Insulin Pen [20-30 units based on carbs at each meal (patient assistance)]    Basaglar KwikPen U-100 Insulin 100  unit/mL (3 mL) subcutaneous Insulin Pen [75 units QAM (Patient Assistance)]    Needles (general)  Needle [31g 5mm pen needles, use as directed]    Accu-Chek Mary Plus Test Strips  Reagent Strips [Test Blood Sugar qid DX E11.9]    alendronate 70 mg oral tablet [once a week]    venlafaxine 75 mg oral tablet [take 3 tablets by oral route once daily with food]        Objective:        Vitals:         Current: 2/14/2020 10:29:59 AM    Ht:  5 ft, 3.5 in;  Wt: 164.6 lbs;  BMI: 28.7T: 98.5 F (oral);  BP: 139/57 mm Hg (right arm, sitting);  P: 72 bpm (right arm (BP Cuff), sitting);  sCr: 0.92 mg/dL;  GFR: 56.29        Exams:     PHYSICAL EXAM:     GENERAL: vital signs recorded - well developed, well nourished;  well groomed;  no apparent distress;     EYES: extraocular movements intact; conjunctiva and cornea are normal; PERRLA;     E/N/T: OROPHARYNX:  normal mucosa, dentition, gingiva, and posterior pharynx;     RESPIRATORY: normal respiratory rate and pattern with no distress; normal breath sounds with no rales, rhonchi, wheezes or rubs;     CARDIOVASCULAR: normal rate; rhythm is regular;  no systolic murmur; no edema;     GASTROINTESTINAL: nontender; normal bowel sounds;     MUSCULOSKELETAL: normal gait; normal overall tone     NEUROLOGIC: mental status: alert and oriented x 3; cranial nerves II-XII grossly intact; sensation: normal to touch and pinprick; vibration and proprioception senses intact;     PSYCHIATRIC: appropriate affect and demeanor; normal psychomotor function; normal speech pattern;         Assessment:         R29.6   Repeated falls       F07.81   Postconcussional syndrome           Plan:         Repeated falls- It is unclear what is causing her repeated falls. With her difficulty remembering her recent fall, I think it is likely that she has postconcussive syndrome.  She should be monitored closely.  She has a follow-up already scheduled with her PCP in 5 days.  ED/return precautions given.         Postconcussional syndrome- See above            Charge Capture:         Primary Diagnosis:     R29.6  Repeated falls           Orders:      [id number]  Office/outpatient visit; established patient, level 4  (In-House)              F07.81  Postconcussional syndrome

## 2021-05-18 NOTE — PROGRESS NOTES
Denisse Malave 1945     Office/Outpatient Visit    Visit Date: Mon, Feb 4, 2019 01:06 pm    Provider: Lety Jansen N.P. (Assistant: Pilar Jensne LPN)    Location: [facility]        Electronically signed by Lety Jansen N.P. on  02/18/2019 12:10:23 PM                             SUBJECTIVE:        CC:     Ms. Malave is a 73 year old White female.  Abrasion to right lower leg;         HPI:         Cellulitis: Pt states redness and warmth to R lower leg x 5-6 days. No meds taken or applied.      ROS:     CONSTITUTIONAL:  Negative for chills, fatigue, fever, and weight change.      EYES:  Negative for blurred vision.      CARDIOVASCULAR:  Negative for chest pain, orthopnea, paroxysmal nocturnal dyspnea and pedal edema.      RESPIRATORY:  Negative for dyspnea.      GASTROINTESTINAL:  Negative for abdominal pain, constipation, diarrhea, nausea and vomiting.      NEUROLOGICAL:  Negative for dizziness, headaches, paresthesias, and weakness.      PSYCHIATRIC:  Negative for anxiety, depression, and sleep disturbances.          PM/FM/:     Last Reviewed on 2/04/2019 01:27 PM by Lety Jansen    Past Medical History:             PREVENTIVE HEALTH MAINTENANCE             BONE DENSITY: was last done 8/15/18 with the following abnormality noted-- osteoporosis     EYE EXAM: Diabetic Eye Exam during this calendar year and results are in chart was last done 9/13/2018     Hepatitis C Medicare Screening: was last done 7/10/18     MAMMOGRAM: Done within last 2 years and results in are chart was last done 8/15/2018 with normal results     PAP SMEAR: No longer indicated due to age and history s/p hysterectomy         PAST MEDICAL HISTORY         Positive for    Hyperlipidemia and    Hypertension;     Positive for    Chronic Renal Failure and    Urinary Incontinence;     Positive for    Type 2 Diabetes: complications include nephropathy and peripheral neuropathy; ;     Positive for     "Cerebrovascular Accident;     Positive for    Pernicious Anemia;         CURRENT MEDICAL PROVIDERS:    Ophthalmologist: Dr Shelby         PAST MEDICAL HISTORY             ADVANCE DIRECTIVE Durable Power of   has copy         Surgical History:         Biopsy of breast; benign    Cholecystectomy    Hysterectomy: d/t concern for uterine cancer but this was benign;      rotator cuff repair R shoulder;    \"ear surgery\";         Family History:     Father: Coronary Artery Disease     Mother: Lung Cancer     Brother(s): Coronary Artery Disease     Sister(s): Neurological/Genetic Myasthenia Gravis     Son(s): Coronary Artery Disease         Social History:     Occupation:    Retired     Marital Status:      Children: 2 children 1 , 1 son Francisco Javier         Tobacco/Alcohol/Supplements:     Last Reviewed on 2019 01:27 PM by Lety Jansen    Tobacco: She has never smoked.          Substance Abuse History:     Last Reviewed on 2019 01:27 PM by Lety Jansen        Mental Health History:     Last Reviewed on 2019 01:27 PM by Lety Jansen        Communicable Diseases (eg STDs):     Last Reviewed on 2019 01:27 PM by Lety Jansen            Current Problems:     Last Reviewed on 2019 01:27 PM by Lety Jansen    Vertigo     Osteoporosis, other     Postmenopausal osteoporosis     Fall NOS     Hyperlipidemia     Hypertension     Type 2 diabetes     B12 deficiency     Chronic kidney disease, Stage III (moderate)     Personal history of transient ischemic attack (TIA), and cerebral infarction without residual deficits     Leg swelling     Other specified administrative purpose         Immunizations:     Prevnar 13 (Pneumococcal PCV 13) 2018         Allergies:     Last Reviewed on 2019 01:27 PM by Lety Jansen      No Known Drug Allergies.         Current Medications:     Last Reviewed on 2019 01:27 PM by Lety Jansen    Effexor XR 75mg " Capsules, Extended Release Take 3 capsule(s) by mouth daily     Crestor 10mg Tablet 1 tab daily     Oxybutynin Chloride 5mg Tablet 1 tab daily     Amlodipine  5mg Tablet 1 tab daily     Losartan 100mg Tablet Take 1 tablet(s) by mouth daily     Accu-Chek Mary Plus Test Strips  Reagent Strips Test Blood Sugar qid DX E11.9     [place] (general)  Needle 31g 5mm pen needles, use as directed     Metformin HCl 1,000mg Tablet 1 tab daily     Basaglar KwikPen 100units/1ml Injection 75 units SC QAM (Patient Assistance)     Humalog KwikPen 100units/1ml Pen System, Disposable Use as directed per sliding scale     Metoprolol Succinate 50mg Tablets, Extended Release one tablet po q hs     Doxycycline  100mg Capsules 1 cap po bid         OBJECTIVE:        Vitals:         Current: 2/4/2019 1:10:11 PM    Ht:  5 ft, 3.5 in;  Wt: 168.2 lbs;  BMI: 29.3    T: 97.3 F (oral);  BP: 160/81 mm Hg (left arm, sitting);  P: 63 bpm (left arm (BP Cuff), sitting);  sCr: 1.03 mg/dL;  GFR: 51.47        Exams:     PHYSICAL EXAM:     GENERAL: vital signs recorded - well developed, well nourished;  no apparent distress;     EYES: extraocular movements intact; conjunctiva and cornea are normal; PERRLA;     NECK: range of motion is normal; thyroid is non-palpable;     RESPIRATORY: normal respiratory rate and pattern with no distress; normal breath sounds with no rales, rhonchi, wheezes or rubs;     CARDIOVASCULAR: normal rate; rhythm is regular;  no systolic murmur; no edema;     BREAST/INTEGUMENT: RLE with erythema and warmth.;     NEUROLOGICAL:  cranial nerves, motor and sensory function, reflexes, gait and coordination are all intact;     PSYCHIATRIC:  appropriate affect and demeanor; normal speech pattern; grossly normal memory;         ASSESSMENT:           682.6   L03.115  Cellulitis of the leg              DDx:         ORDERS:         Meds Prescribed:       Mupirocin 2% Topical Ointment Apply to affected area tid x 10 days  #22 (Twenty Two) gm  Refills: 0                 PLAN:          Cellulitis of the leg           Prescriptions:       Mupirocin 2% Topical Ointment Apply to affected area tid x 10 days  #22 (Twenty Two) gm Refills: 0           Patient Education Handouts:       Cellulitis              CHARGE CAPTURE:           Primary Diagnosis:     682.6 Cellulitis of the leg            L03.115    Cellulitis of right lower limb              Orders:          [id number]   Office/outpatient visit; established patient, level 3  (In-House)

## 2021-05-18 NOTE — PROGRESS NOTES
"Denisse Malave. 1945     Office/Outpatient Visit    Visit Date: Wed, Mar 28, 2018 09:11 am    Provider: Geeta Patterson MD (Assistant: Kiya Oakley MA)    Location: [facility]        Electronically signed by Geeta Patterson MD on  03/28/2018 12:16:49 PM                             SUBJECTIVE:        CC:     Ms. Malave is a 72 year old White female.  This is a follow-up visit.  DM, HTN, HLD         HPI: Denisse is here today for f/u on chronic issues.        She is currently on Basaglar 54 units QAM and Humalog sliding scale QAM (usually around 20 units) for diabetes.  She was on metformin 1000 mg BID but stopped due to diarrhea.  At her last visit, we had talked about going back on it at 500 mg BID.  She has been taking it 500 mg once a day (she forgets the nighttime dose).  No problems with diarrhea, so she thinks she can go up to a full tab once daily.  She is on an ACEI.  She has been on Crestor but stopped that because of muscle cramps.  Her LDL was 200.  She is due for foot exam.  She is UTD on eye exam (11/2017).  She has a history of stroke in 2005.        She is still having problems with a lot of falls.        She is on metoprolol succinate, losartan and amlodipine for HTN.  BP has been well controlled.  No CP, SOB, palpitations.          She was on Crestor for HLD but has stopped taking that because of muscle cramps.  We had talked about restarting that at 10 mg (rather than 40 mg).  She has had some leg cramps even at this dose.        She went back to [facility] last week.  She is having delayed fracture healing.  They are going to some electrical stimulation to try to stimulate it.        She developed a couple of sores just under the navel.  She has been trying Neosporin but that has not helped.      ROS:     CONSTITUTIONAL:  Negative for fatigue and fever.      EYES:  Positive for eye pain ( right ) and \"double vision\" - lasts for 3 months.   Negative for blurred vision.  " "    E/N/T:  Positive for diminished hearing ( bilaterally ).   Negative for nasal congestion.      CARDIOVASCULAR:  Negative for chest pain and palpitations.      RESPIRATORY:  Negative for recent cough and dyspnea.      GASTROINTESTINAL:  Negative for abdominal pain, constipation, diarrhea, nausea and vomiting.      MUSCULOSKELETAL:  Negative for arthralgias and myalgias.      NEUROLOGICAL:  Positive for vertigo.   Negative for paresthesias or weakness.          PMH/FMH/SH:     Last Reviewed on 3/28/2018 09:43 AM by Geeta Patterson    Past Medical History:                 PAST MEDICAL HISTORY         Hyperlipidemia    Hypertension     CKD     Type 2 Diabetes     Cerebrovascular Accident     Pernicious Anemia         CURRENT MEDICAL PROVIDERS:    Optometrist - Dr. Biju Shelby         PREVENTIVE HEALTH MAINTENANCE             EYE EXAM: Diabetic Eye Exam during this calendar year and results are in chart was last done 2017     MAMMOGRAM: Done within last 2 years and results in are chart was last done 17 with normal results         Surgical History:         Biopsy of breast; benign    Cholecystectomy    Hysterectomy: d/t concern for uterine cancer but this was benign;      rotator cuff repair R shoulder;    \"ear surgery\";         Family History:     Father: Coronary Artery Disease     Mother: Lung Cancer     Brother(s): Coronary Artery Disease     Sister(s): Neurological/Genetic Myasthenia Gravis     Son(s): Coronary Artery Disease         Social History:     Occupation:    Retired     Marital Status:      Children: 2 children 1 , 1 son Francisco Javier         Tobacco/Alcohol/Supplements:     Last Reviewed on 3/28/2018 09:43 AM by Geeta Patterson    Tobacco: She has never smoked.          Substance Abuse History:     Last Reviewed on 3/28/2018 09:43 AM by Geeta Patterson        Mental Health History:     Last Reviewed on 3/28/2018 09:43 AM by Geeta Patterson        Communicable Diseases (eg STDs):     " Last Reviewed on 3/28/2018 09:43 AM by Geeta Patterson            Current Problems:     Last Reviewed on 2/14/2018 03:36 PM by eGeta Patterson    Hyperlipidemia     Hypertension     Type 2 diabetes     B12 deficiency     Chronic kidney disease, Stage III (moderate)     Personal history of transient ischemic attack (TIA), and cerebral infarction without residual deficits         Immunizations:     None        Allergies:     Last Reviewed on 2/14/2018 03:36 PM by Geeta Patterson      No Known Drug Allergies.         Current Medications:     Last Reviewed on 2/14/2018 03:36 PM by Geeta Patterson    Effexor XR 75mg Capsules, Extended Release Take 3 capsule(s) by mouth daily     Basaglar KwikPen 100units/1ml Injection as directed     Losartan 100mg Tablet Take 1 tablet(s) by mouth daily     Amlodipine  5mg Tablet 1 tab daily     Oxybutynin Chloride 5mg Tablet 1 tab daily     Crestor 10mg Tablet 1 tab daily     Metformin HCl 500mg Tablet 1 tab bid     Humalog KwikPen 100units/1ml Pen System, Disposable Use as directed per sliding scale     Metoprolol Succinate 50mg Tablets, Extended Release one tablet po q hs         OBJECTIVE:        Vitals:         Current: 3/28/2018 9:15:30 AM    Ht:  5 ft, 3.5 in;  Wt: 180.4 lbs;  BMI: 31.5    T: 97.5 F;  BP: 123/70 mm Hg (left arm, sitting);  P: 83 bpm (left arm (BP Cuff), sitting);  sCr: 0.98 mg/dL;  GFR: 56.52        Exams:     PHYSICAL EXAM:     GENERAL: vital signs recorded - well developed, well nourished;  well groomed;  no apparent distress;     EYES: extraocular movements intact; conjunctiva and cornea are normal; PERRLA;     E/N/T: OROPHARYNX:  normal mucosa, dentition, gingiva, and posterior pharynx;     RESPIRATORY: normal respiratory rate and pattern with no distress; normal breath sounds with no rales, rhonchi, wheezes or rubs;     CARDIOVASCULAR: normal rate; rhythm is regular;  no systolic murmur; no edema;     GASTROINTESTINAL: nontender; normal bowel sounds;      BREAST/INTEGUMENT: two dime-sized, no drainage, very minimal erythema around each, non-tender;     MUSCULOSKELETAL: normal gait; normal overall tone     Left foot exam    Protective sensation using Monofilament test: Decreased, with estimated force of 2 grams applied.    Vascular status: normal peripheral vascular exam with palpable dorsal pedal and posterior tibal pulses and brisk digital capillary refill    Skin is intact without sores or ulcers    Right foot exam    Protective sensation using Monofilament test: Decreased, with estimated force of 2 grams applied.    Vascular status: normal peripheral vascular exam with palpable dorsal pedal and posterior tibal pulses and brisk digital capillary refill    Skin is intact without sores or ulcers         ASSESSMENT           250.00   E11.9  Type 2 diabetes              DDx:     401.1   I10  Hypertension              DDx:     272.4   E78.2  Hyperlipidemia              DDx:     V12.54   Z86.73  Personal history of transient ischemic attack (TIA), and cerebral infarction without residual deficits              DDx:     782.1   R21  Rash              DDx:         ORDERS:         Meds Prescribed:       Mupirocin 2% Cream Apply small amount to affected area tid  #30 (Thirty) gm Refills: 0         Other Orders:       2028F  Foot examination performed (includes examination through visual inspection, sensory exam with monofi  (In-House)                   PLAN:          Type 2 diabetes She is currently on Basaglar 54 units QAM and Humalog sliding scale QAM (usually around 20 units) for diabetes.  She states her insurance is no longer going to cover the Basaglar, however, and she only has about 2 weeks worth left.  We will look into switching to another med on her formulary vs accessing the [facility] with Sharee Flynn.  She has worked with Sharee in the past.  She actually has started taking metformin 500 mg once daily but can't remember to take the nighttime dose.  However, she is  "tolerating it well without diarrhea.  She will increase to a full tab in the morning.  (She is taking leftovers from her old rx.)  If she does okay with that, after about 6 weeks, I would like her to add in another 1/2 tab at bedtime.  She is on an ACEI and statin.  She is UTD on eye exam (11/2017).  Foot exam today shows decreased sensation.  I think a combination of her high sugars (A1c was 9.8) and peripheral neuropathy are large contributing factors to her recurrent falls.  We discussed this today.  She notes that she is \"addicted\" to sugar and frequently eats high sugar snacks even when she knows she shouldn't.  She briefly broached the idea of therapy, which I actually think is a great idea, but subsequently back-tracked from that.  However, we will revisit this in the future.  RTC 3 months once I get back from maternity leave.         She is getting enrolled in our CCM program today with Sharon Stinson as her nurse manager.         FOLLOW-UP: Schedule a follow-up visit in 3 months..            Patient Education Handouts:       [facility] [place] [facility]           Hypertension BP at goal.  No refills needed.          Hyperlipidemia LDL was 200.  She has restarted her Crestor 10 mg but is having leg cramps again.  Will have her go to every other day for now and see if this helps.          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  I did explicitly state to her that we are flirting with another stroke if we cannot get her blood sugar levels down.          Rash           Prescriptions:       Mupirocin 2% Cream Apply small amount to affected area tid  #30 (Thirty) gm Refills: 0             Other Orders:       2028F  Foot examination performed (includes examination through visual inspection, sensory exam with monofi  (In-House)           Patient Recommendations:        For  Type 2 diabetes:     Schedule a follow-up visit in 3 months.    "             APPOINTMENT INFORMATION:        Monday Tuesday Wednesday Thursday Friday Saturday Sunday            Time:___________________AM  PM   Date:_____________________             CHARGE CAPTURE           **Please note: ICD descriptions below are intended for billing purposes only and may not represent clinical diagnoses**        Primary Diagnosis:         250.00 Type 2 diabetes            E11.9    Type 2 diabetes mellitus without complications              Orders:          54474   Office/outpatient visit; established patient, level 4  (In-House)           401.1 Hypertension            I10    Essential (primary) hypertension    272.4 Hyperlipidemia            E78.2    Mixed hyperlipidemia    V12.54 Personal history of transient ischemic attack (TIA), 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  Thanks, BZM        Addendum: 04/23/2018 04:30 PM - Sharon Stinson         Visit Note Faxed to:        [place], [facility] [place] ; Number [phone]

## 2021-05-18 NOTE — PROGRESS NOTES
"Denisse Malave  1945     Office/Outpatient Visit    Visit Date: Wed, Aug 5, 2020 09:25 am    Provider: Geeta Patterson MD (Assistant: Rosalia Rome LPN)    Location: [facility]        Electronically signed by Geeta Patterson MD on  08/05/2020 04:35:11 PM                             Subjective:        CC: pt states she doesn't need and is no longer taking Alendronate. does not have bottle Crestor, shes supposed to take she states but doesn't have it. she's lost her bottlef/u appt    HPI:       She is due for colonoscopy.  Pap smear is no longer indicated by age and history.  She is UTD on mammogram, last done 8/2019 and that was normal.  She is UTD on DEXA, last done 8/2018 and this showed osteoporosis.  She is on alendronate (although she recently stopped taking it, stating that she does not need it any longer).  She is UTD on Pneumovax (10/2019), Prevnar (7/2018).  She is due for Shingrix, Td and flu.  She is due for routine labs including DM panel and MAB.  She is UTD on eye exam (9/2019).  She is due for foot exam (4/2019).        She is having ataxia in her legs.  \"I have to start running and then I almost fall down.\"  We had made an appt with Dr. Avalos but she DNKAed this.  She told Sharon that she was feeling better.  \"I don't feel good now.\"  +Generalized weakness, worst in the legs.  +Fatigue.  Food does not taste good to her.    Ms. Malave is here for a Medicare wellness visit.          Self-Assessment of Health: She rates her health as good. She rates her confidence of being able to control/manage most of her health problems as not very confident. Her physical/emotional health has limited her social activites quite a bit.  A review of cognitive impairment was performed, including ability to drive a car, manage finances, and any memory changes, and was found to be negative.  A review of functional ability and level of safety was performed and the following was noted: Bathing ( performs " independently ); Dressing: ( performs independently ); Eating: ( performs independently ); Toilet use: ( performs independently ); Transferring: ( Performs with assistance ); Urine and Bowel continence: ( Abnormal ) Falls Risk: Has fallen 2 or more times or had one fall with injury in the past year.  In regard to hearing, she reports having trouble hearing the TV/radio when others do not, having to strain to hear or understand conversations and wearing hearing aid(s).  Concerning home safety, she reports that at home she DOES have adequate lighting, functioning smoke alarms and absence of throw rugs, but not a skid resistant shower/tub or grab bars in the bath.  Physical Activity: She never excercises.; Type of diet patient normally eats is described as poor--needs improvement.  Tobacco: She has never smoked.  Preventative Health updated today           PHQ-9 Depression Screening: Completed form scanned and in chart; Total Score 18     ROS:     CONSTITUTIONAL:  Negative for fatigue and fever.      EYES:  Negative for blurred vision.      E/N/T:  Positive for diminished hearing and nasal congestion.   Negative for frequent rhinorrhea.      CARDIOVASCULAR:  Negative for chest pain and palpitations.      RESPIRATORY:  Negative for recent cough and dyspnea.      GASTROINTESTINAL:  Negative for abdominal pain, constipation, diarrhea, nausea and vomiting.      MUSCULOSKELETAL:  Positive for arthralgias.   Negative for myalgias.      NEUROLOGICAL:  Positive for ataxia, weakness ( generalized ) and gait disturbance.   Negative for paresthesias.      PSYCHIATRIC:  Negative for anxiety, depression and sleep disturbance.          Past Medical History / Family History / Social History:         Last Reviewed on 8/05/2020 10:06 AM by Geeta Patterson    Past Medical History:             PREVENTIVE HEALTH MAINTENANCE             BONE DENSITY: was last done 8/15/18 with the following abnormality noted-- osteoporosis on Fosamax      "COLORECTAL CANCER SCREENING: declines colorectal cancer screening, understands reason for testing     EYE EXAM: Diabetic Eye Exam during this calendar year and results are in chart was last done 19 The next one is due  6 months mild/moderate diabetic retinopathy     Hepatitis C Medicare Screening: was last done 7/10/18     MAMMOGRAM: Done within last 2 years and results in are chart was last done 2019 with normal results     PAP SMEAR: No longer indicated due to age and history s/p hysterectomy         PAST MEDICAL HISTORY         Positive for    Hyperlipidemia and    Hypertension;     Positive for    Chronic Renal Failure and    Urinary Incontinence;     Positive for    Type 2 Diabetes: complications include nephropathy and peripheral neuropathy; ;     Positive for    Cerebrovascular Accident;     Positive for    Pernicious Anemia;         CURRENT MEDICAL PROVIDERS:    Cardiologist: Dr Munroe    Endocrinologist: Ira France NP    E/N/T: Dr Christianson    Ophthalmologist: Dr Shelby             ADVANCE DIRECTIVES: Durable Power of   has copy         Surgical History:         Biopsy of breast; benign    Cholecystectomy    Hysterectomy: d/t concern for uterine cancer but this was benign;     rotator cuff repair R shoulder;    \"ear surgery\";         Family History:     Father: Coronary Artery Disease     Mother: Lung Cancer     Brother(s): Coronary Artery Disease     Sister(s): Neurological/Genetic Myasthenia Gravis     Son(s): Coronary Artery Disease         Social History:     Occupation: Retired (Prior occupation: )     Marital Status:      Children: 2 children 1 , 1 son Francisco Javier         Functional Status: relies on her siblings for assistance with care         Tobacco/Alcohol/Supplements:     Last Reviewed on 2020 10:06 AM by Geeta Patterson    Tobacco: She has never smoked.  Non-drinker         Substance Abuse History:     Last Reviewed on 2020 10:06 AM by " Geeta Patterson        Mental Health History:     Last Reviewed on 8/05/2020 10:06 AM by Geeta Patterson        Communicable Diseases (eg STDs):     Last Reviewed on 8/05/2020 10:06 AM by Geeta Patterson        Current Problems:     Last Reviewed on 6/16/2020 11:50 AM by Geeta Patterson    Vitamin B12 defic anemia due to intrinsic factor deficiency    CKD - Chronic kidney disease, stage 3 (moderate)    H/o stroke - Personal history of transient ischemic attack (TIA), and cerebral infarction without residual deficits    HLD - Mixed hyperlipidemia    HTN - Essential (primary) hypertension    Other osteoporosis without current pathological fracture    Benign paroxysmal vertigo, left ear    Anxiety disorder, unspecified    Type 2 diabetes mellitus with hyperglycemia    Repeated falls    Encounter for screening for depression    Muscle weakness (generalized)    Unspecified injury of right elbow, initial encounter    Age-related cognitive decline    Postconcussional syndrome    Unspecified injury of head, initial encounter    Intercostal pain    Acute maxillary sinusitis, unspecified    Encounter for other administrative examinations        Immunizations:     Prevnar 13 (Pneumococcal PCV 13) 7/18/2018    Pneumococcal, 23-valent IM/SC (adult and pt >=2yr) 10/31/2019        Allergies:     Last Reviewed on 6/16/2020 11:50 AM by Geeta Patterson    oxybutynin chloride: Dizzy,Hallucinations  (Adverse Reaction)        Current Medications:     Last Reviewed on 6/16/2020 11:50 AM by Geeta Patterson    Losartan 100 mg oral tablet [Take 1 tablet(s) by mouth daily]    Amlodipine  5 mg oral tablet [1 tab daily]    Crestor 10mg Tablet [1 tab daily]    metFORMIN 1,000 mg oral tablet [1 tab daily]    metoprolol succinate 50 mg oral Tablet, Extended Release 24 hr [TAKE ONE TABLET BY MOUTH EVERY NIGHT AT BEDTIME]    Basaglar KwikPen U-100 Insulin 100 unit/mL (3 mL) subcutaneous Insulin Pen [75 units QAM (Patient Assistance)]    Michael  "KwikPen Insulin 100 unit/mL subcutaneous Insulin Pen [Administer based on meal size: small=15 units, med=20 units, large=25 units; plus BS >150 give: 2 units 151-175 and increase by 1 unit for every 25 mg/dL thereafter (patient Assistance)]    BD Ultra-Fine Mini Pen Needle 31 gauge x 3/16\" [USE AS DIRECTED]    Accu-Chek Mary Plus Test Strips  Reagent Strips [Test Blood Sugar qid DX E11.9]    alendronate 70 mg oral tablet [TAKE ONE TABLET BY MOUTH ONCE WEEKLY]    venlafaxine 75 mg oral tablet [TAKE THREE TABLETS BY MOUTH DAILY WITH FOOD]        Objective:        Vitals:         Current: 8/5/2020 9:52:32 AM    Ht:  5 ft, 3.5 in;  Wt: 172 lbs;  BMI: 30.0T: 97.8 F (temporal);  BP: 186/79 mm Hg (right arm, sitting);  P: 83 bpm (right arm (BP Cuff), sitting);  sCr: 0.92 mg/dL;  GFR: 56.53VA: 20/200 OD, 20/70 OS (near, without correction)        Repeat:     9:53:2 AM  BP:   191/86mm Hg (right arm, sitting) 10:54:55 AM  BP:   194/91mm Hg (right arm, sitting) 9:53:17 AM  P:   81bpm (right arm (BP Cuff), sitting) 10:55:10 AM  P:   82bpm (right arm (BP Cuff), sitting)     Exams:     PHYSICAL EXAM:     GENERAL: vital signs recorded - well developed, well nourished;  well groomed;  no apparent distress;     EYES: extraocular movements intact; conjunctiva and cornea are normal; PERRLA;     E/N/T: OROPHARYNX:  normal mucosa, dentition, gingiva, and posterior pharynx;     RESPIRATORY: normal respiratory rate and pattern with no distress; normal breath sounds with no rales, rhonchi, wheezes or rubs;     CARDIOVASCULAR: normal rate; rhythm is regular;  no systolic murmur; no edema;     GASTROINTESTINAL: nontender; normal bowel sounds;     MUSCULOSKELETAL: normal gait; normal overall tone     NEUROLOGIC: mental status: alert and oriented x 3; cranial nerves II-XII grossly intact; Sensation: intact to light touch;  Reflexes: brachioradialis: 1+; knee jerks: 1+;  Coordination/Cerebellar: finger-to-nose test accurate but slow; " heel-to-shin test accurate but slow;  normal rapid alternating movements;  negative pronator drift;     PSYCHIATRIC: appropriate affect and demeanor; normal psychomotor function; normal speech pattern;     Left foot exam    Protective sensation using Monofilament test: Loss of protective sensation. Approximately 4 grams of force is applied without sensory awareness.    Vascular status: normal peripheral vascular exam with palpable dorsal pedal and posterior tibal pulses and brisk digital capillary refill    Skin is intact without sores or ulcers    Right foot exam    Protective sensation using Monofilament test: Loss of protective sensation. Approximately 4 grams of force is applied without sensory awareness.    Vascular status: normal peripheral vascular exam with palpable dorsal pedal and posterior tibal pulses and brisk digital capillary refill    Skin is intact without sores or ulcers         Assessment:         Z00.00   Encounter for general adult medical examination without abnormal findings       Z13.31   Encounter for screening for depression       E11.65   Type 2 diabetes mellitus with hyperglycemia       R27.0   Ataxia, unspecified       M81.8   Other osteoporosis without current pathological fracture       Z12.31   Encounter for screening mammogram for malignant neoplasm of breast       I10   HTN - Essential (primary) hypertension       F41.9   Anxiety disorder, unspecified           ORDERS:         Meds Prescribed:       [Refilled] Amlodipine  5 mg oral tablet [1 tab daily], #90 (ninety) tablets, Refills: 1 (one)       [Refilled] venlafaxine 75 mg oral tablet [TAKE THREE TABLETS BY MOUTH DAILY WITH FOOD], #270 (two hundred and seventy) tablets, Refills: 1 (one)       [Refilled] Crestor 10 mg oral tablet [1 tab daily], #90 (ninety) tablets, Refills: 1 (one)         Radiology/Test Orders:       10607  Screening mammography bi 2-view inc CAD  (Send-Out)            49128  DXA, bone density study, 1 or more  sites; axial skeleton (eg hips, pelvis, spine)  (Send-Out)            2022F  Dilated retinal eye exam w/interpret by ophthalmologist/optometrist documented/reviewed (DM)4  (In-House)            85866  MRI, brain; with contrast  (Send-Out)            34900  MRI, brain; without contrast  (Send-Out)              Lab Orders:       37580  DIAB1 - [facility] LIPID,CMP, A1C: 29479, 46106, 88199  (Send-Out)            04990  CHRIS - [facility] Microablbumin, quantitative  (Send-Out)            86771  BDCBC - [facility] CBC with 3 part diff  (Send-Out)            66268  B12FO - [facility] Vitamin B12 with Folate  (Send-Out)            26876  TSH - [facility] TSH  (Send-Out)              Procedures Ordered:         Annual wellness visit, includes a PPPS, subsequent visit  (In-House)              Other Orders:         Depression screen positive and follow up plan documented  (In-House)            1100F  Pt screen for fall risk; document 2+ falls in the past yr or any fall w/injury in past year (MICHAEL)  (In-House)              Screening mammogram results documented  (Send-Out)            1124F  Advance Care Planning discussed and doc in MR; no surrogate named or advance care plan provided  (Send-Out)            2028F  Foot examination performed (includes examination through visual inspection, sensory exam with monofilament, and pulse exam - report when any of the three components are completed) (DM)4  (In-House)                      Plan:         Encounter for general adult medical examination without abnormal findingsShe is due for colonoscopy; declines.  Pap smear is no longer indicated by age and history.  She is UTD on mammogram, last done 8/2019 and that was normal; repeat ordered.  She is UTD on DEXA, last done 8/2018 and this showed osteoporosis; repeat ordered.  She is on alendronate (although she recently stopped taking it, stating that she does not need it any longer).  She is UTD on Pneumovax (10/2019), Prevnar (7/2018).  She is due for  Shingrix, Td and flu.  She is due for routine labs including DM panel and MAB; ordered.  She is UTD on eye exam (9/2019).  She is due for foot exam (4/2019); exam today shows complete lack of sensation in the bilateral feet.  No fall risk, no memory issues.  She is currently being treated for depression.  She lives alone.  She is able to drive and perform ADLs/manage finances independently.  Hearing is adequate.  She does not have a living will.  Preventive services handout and safety handout were given to her.  Current doctor list updated.  RTC 6 months.    MIPS PHQ-9 Depression Screening: Completed form scanned and in chart; Total Score 18 Positive Depression Screen: Stable on medications. No suicidal ideation.  ADVANCE DIRECTIVES (Please update in PMH): Durable Power of  Son Edgard Malave           Orders:         Annual wellness visit, includes a PPPS, subsequent visit  (In-House)              Depression screen positive and follow up plan documented  (In-House)            1100F  Pt screen for fall risk; document 2+ falls in the past yr or any fall w/injury in past year (MICHAEL)  (In-House)              Screening mammogram results documented  (Send-Out)            2022F  Dilated retinal eye exam w/interpret by ophthalmologist/optometrist documented/reviewed (DM)4  (In-House)            1124F  Advance Care Planning discussed and doc in MR; no surrogate named or advance care plan provided  (Send-Out)              Type 2 diabetes mellitus with hyperglycemiaComplete lack of sensation on foot exam today; this is the likely etiology of her balance issues and falls.  Not re-initiating her Neuro referral at this time, since hallucinations have stopped since d/cing oxybutynin.  Falls continue to be an issue.  She describes new ataxia, so will check MRI brain just to be sure, although coordination testing on neuro exam today was fine.  Checking B12 and TSH as well.    LABORATORY:  Labs ordered to be  "performed today include CBC, Diabetes Panel 1; CMP, Lipid, A1C, and Microalbumin.            Prescriptions:       [Refilled] Crestor 10 mg oral tablet [1 tab daily], #90 (ninety) tablets, Refills: 1 (one)           Orders:       98962  DIAB1 - [facility] LIPID,CMP, A1C: 44329, 81434, 08156  (Send-Out)            80568  CHRIS - [facility] Microablbumin, quantitative  (Send-Out)            17098  BDCBC - [facility] CBC with 3 part diff  (Send-Out)              Ataxia, unspecifiedAs above.    LABORATORY:  Labs ordered to be performed today include B12 with Folate and TSH.      RADIOLOGY:  I have ordered a head MRI with contrast without contrast to be done today.            Orders:       44109  MRI, brain; with contrast  (Send-Out)            84754  MRI, brain; without contrast  (Send-Out)            07311  B12FO - [facility] Vitamin B12 with Folate  (Send-Out)            21850  TSH - [facility] TSH  (Send-Out)              Other osteoporosis without current pathological fractureNot taking her alendronate.  Repeat ordered.        RADIOLOGY:  I have ordered Dexa Scan to be done today.            Orders:       30726  DXA, bone density study, 1 or more sites; axial skeleton (eg hips, pelvis, spine)  (Send-Out)              Encounter for screening mammogram for malignant neoplasm of breast        RADIOLOGY:  I have ordered Mammogram Screening to be done today.            Orders:       51664  Screening mammography bi 2-view inc CAD  (Send-Out)              HTN - Essential (primary) hypertensionHas been off amlodipine because \"I ran out.\"  Refills sent today.  Unclear if she is taking the losartan or metoprolol either, but she says that she is.  Checking labs.  BP high but she is asymptomatic today without chest pain, SOB, palpitations, etc.          Prescriptions:       [Refilled] Amlodipine  5 mg oral tablet [1 tab daily], #90 (ninety) tablets, Refills: 1 (one)         Anxiety disorder, unspecified          Prescriptions:       [Refilled] venlafaxine 75 mg " oral tablet [TAKE THREE TABLETS BY MOUTH DAILY WITH FOOD], #270 (two hundred and seventy) tablets, Refills: 1 (one)             Other Orders      2028F  Foot examination performed (includes examination through visual inspection, sensory exam with monofilament, and pulse exam - report when any of the three components are completed) (DM)4  (In-House)              Patient Recommendations:        For  Encounter for general adult medical examination without abnormal findings:    I also recommend Son Edgard Malave.              Charge Capture:         Primary Diagnosis:     Z00.00  Encounter for general adult medical examination without abnormal findings           Orders:        Annual wellness visit, includes a PPPS, subsequent visit  (In-House)              Depression screen positive and follow up plan documented  (In-House)            1100F  Pt screen for fall risk; document 2+ falls in the past yr or any fall w/injury in past year (MICHAEL)  (In-House)            2022F  Dilated retinal eye exam w/interpret by ophthalmologist/optometrist documented/reviewed (DM)4  (In-House)              Z13.31  Encounter for screening for depression     E11.65  Type 2 diabetes mellitus with hyperglycemia     R27.0  Ataxia, unspecified     M81.8  Other osteoporosis without current pathological fracture     Z12.31  Encounter for screening mammogram for malignant neoplasm of breast     I10  HTN - Essential (primary) hypertension     F41.9  Anxiety disorder, unspecified         Other Orders:       2028F  Foot examination performed (includes examination through visual inspection, sensory exam with monofilament, and pulse exam - report when any of the three components are completed) (DM)4  (In-House)              ADDENDUMS:      ____________________________________    Addendum: 10/05/2020 04:09 PM - Sharon Stinson         Visit Note Faxed to:        [facility]; Number [phone]

## 2021-05-18 NOTE — PROGRESS NOTES
"Denisse Malave. 1945     Office/Outpatient Visit    Visit Date: Wed, Feb 14, 2018 02:58 pm    Provider: Geeta Patterson MD (Assistant: Dorothy Gonzalez MA)    Location: [facility]        Electronically signed by Geeta Patterson MD on  02/14/2018 03:56:09 PM                             SUBJECTIVE:        CC:     Ms. Malave is a 72 year old White female.  This is her first visit to the clinic.  Pt is not taking Crestor or Metformin;         HPI: Denisse is here today to establish care from Dr. George Abbott.        She is currently on Basaglar 54 units QAM and Humalog sliding scale QAM (usually around 20 units) for diabetes.  She has been checking BG fasting and it can range from 170-300s.  She was previously on metformin but is no longer taking that because it gave her bad diarrhea.  She is on an ACEI.  She has stopped her statin.  She is due for foot exam.  She is due for eye exam.  She has a history of stroke in 2005.  No residual deficits from that.  However, she has had multiple falls.  She usually is just standing there and all of a sudden falls.           She is on metoprolol succinate, losartan and amlodipine for HTN.  BP has been well controlled.  No CP, SOB, palpitations.          She was on Crestor for HLD but has stopped taking that because of muscle cramps.        She is on oxybutynin for urinary urge incontinence.  No problems with leakage.        She is on Effexor for depression.  \"They level me out.\"  No problems with depression or anxiety.        She fell and broke her L elbow about 6 weeks ago.  She has been following with [facility].  She is currently in a brace.      ROS:     CONSTITUTIONAL:  Negative for fatigue and fever.      EYES:  Positive for eye pain ( right ) and \"double vision\" - lasts for 3 months.   Negative for blurred vision.      E/N/T:  Positive for diminished hearing ( bilaterally ).   Negative for nasal congestion.      CARDIOVASCULAR:  Negative for chest pain " "and palpitations.      RESPIRATORY:  Negative for recent cough and dyspnea.      GASTROINTESTINAL:  Negative for abdominal pain, constipation, diarrhea, nausea and vomiting.      GENITOURINARY:  Negative for dysuria and urinary incontinence.      MUSCULOSKELETAL:  Negative for arthralgias and myalgias.      INTEGUMENTARY/BREAST:  Negative for atypical mole(s) and rash.      NEUROLOGICAL:  Positive for vertigo.   Negative for paresthesias or weakness.      PSYCHIATRIC:  Negative for anxiety, depression and sleep disturbance.          PMH/FMH/SH:     Last Reviewed on 2018 03:36 PM by Geeta Patterson    Past Medical History:                 PAST MEDICAL HISTORY         Hyperlipidemia    Hypertension     CKD     Type 2 Diabetes     Cerebrovascular Accident     Pernicious Anemia         CURRENT MEDICAL PROVIDERS:    Optometrist - Dr. Biju Shelby         PREVENTIVE HEALTH MAINTENANCE             MAMMOGRAM: Done within last 2 years and results in are chart was last done 17 with normal results         Surgical History:         Biopsy of breast; benign    Cholecystectomy    Hysterectomy: d/t concern for uterine cancer but this was benign;      rotator cuff repair R shoulder;    \"ear surgery\";         Family History:     Father: Coronary Artery Disease     Mother: Lung Cancer     Brother(s): Coronary Artery Disease     Sister(s): Neurological/Genetic Myasthenia Gravis     Son(s): Coronary Artery Disease         Social History:     Occupation:    Retired     Marital Status:      Children: 2 children 1 , 1 son Francisco Javier         Tobacco/Alcohol/Supplements:     Last Reviewed on 2018 03:36 PM by Geeta Patterson    Tobacco: She has never smoked.          Substance Abuse History:     Last Reviewed on 2018 03:36 PM by Geeta Patterson        Mental Health History:     Last Reviewed on 2018 03:36 PM by Geeta Patterson        Communicable Diseases (eg STDs):     Last Reviewed on 2018 03:36 PM by " Geeta Patterson            Current Problems:     Type 2 diabetes     B12 deficiency     Chronic kidney disease, Stage III (moderate)     Personal history of transient ischemic attack (TIA), and cerebral infarction without residual deficits         Immunizations:     None        Allergies:     Last Reviewed on 2/14/2018 03:02 PM by Dorothy Gonzalez      No Known Drug Allergies.         Current Medications:     Last Reviewed on 2/14/2018 03:12 PM by Dorothy Gonzalez    Basaglar KwikPen 100units/1ml Injection as directed     Effexor XR 75mg Capsules, Extended Release Take 3 capsule(s) by mouth daily     Losartan 100mg Tablet Take 1 tablet(s) by mouth daily     Amlodipine  5mg Tablet 1 tab daily     Crestor 40mg Tablet 1 tab daily     Humalog KwikPen 100units/1ml Pen System, Disposable Use as directed per sliding scale     Metformin HCl 1,000mg Tablet 1 tab bid     Metoprolol Succinate 50mg Tablets, Extended Release one tablet po q hs     Oxybutynin Chloride 5mg Tablet 1 tab daily         OBJECTIVE:        Vitals:         Current: 2/14/2018 3:01:41 PM    Ht:  5 ft, 3.5 in;  Wt: 177.9 lbs;  BMI: 31.0    T: 97 F;  BP: 134/77 mm Hg (right arm, sitting);  P: 75 bpm (right arm (BP Cuff), sitting)        Exams:     PHYSICAL EXAM:     GENERAL: vital signs recorded - well developed, well nourished;  well groomed;  no apparent distress;     EYES: extraocular movements intact; conjunctiva and cornea are normal; PERRLA;     E/N/T: OROPHARYNX:  normal mucosa, dentition, gingiva, and posterior pharynx;     NECK: range of motion is normal; thyroid exam reveals not enlarged;     RESPIRATORY: normal respiratory rate and pattern with no distress; normal breath sounds with no rales, rhonchi, wheezes or rubs;     CARDIOVASCULAR: normal rate; rhythm is regular;  no systolic murmur; no edema;     GASTROINTESTINAL: nontender; normal bowel sounds;     LYMPHATIC: no enlargement of cervical or facial nodes;     MUSCULOSKELETAL: normal gait;  normal overall tone     NEUROLOGIC: mental status: alert and oriented x 3; Reflexes: brachioradialis: 2+; knee jerks: 2+;     PSYCHIATRIC: appropriate affect and demeanor; normal psychomotor function; normal speech pattern;         ASSESSMENT           250.00   E11.9  Type 2 diabetes              DDx:     585.3   N18.3  Chronic kidney disease, Stage III (moderate)              DDx:     266.2   D51.0  B12 deficiency              DDx:     401.1   I10  Hypertension              DDx:     272.4   E78.2  Hyperlipidemia              DDx:         ORDERS:         Meds Prescribed:       Refill of: Crestor (Rosuvastatin) 10mg Tablet 1 tab daily  #90 (Ninety) tablet(s) Refills: 1       Metformin HCl 500mg Tablet 1 tab bid  #60 (Sixty) tablet(s) Refills: 2         Lab Orders:       [id number]  DIAB - [facility] CMP A1C LIPID AND MICRO ALBUM CR RATIO: [id number]  (Send-Out)         35304  TSH - H TSH  (Send-Out)         38961  VB12 - [facility] Vitamin B12  (Send-Out)         51222  BDCB2 - [facility] CBC w/o diff  (Send-Out)                   PLAN:          Type 2 diabetes New patient establishing care.  Sounds like diabetes has been pretty poorly controlled.  Going to try going down on the metformin to 500 mg BID and see if she tolerates that better.  She is on Basaglar and Humalog.  UTD on eye exam from Dr. Biju Shelby; requesting records.  Will get foot exam at her f/u visit in 6 weeks.     LABORATORY:  Labs ordered to be performed today include Diabetes Panel 2;CMP, A1C, Lipid, Microalbumin:Creatinine Ratio.            Prescriptions:       Metformin HCl 500mg Tablet 1 tab bid  #60 (Sixty) tablet(s) Refills: 2           Orders:       16089  DIAB2 - [facility] CMP A1C LIPID AND MICRO ALBUM CR RATIO: [id number]  (Send-Out)             Patient Education Handouts:       [facility] Medication Compliance           Chronic kidney disease, Stage III (moderate) Checking labs.          B12 deficiency Checking labs.     LABORATORY:   Labs ordered to be performed today include B12 and CBC W/O DIFF.            Orders:       [id number]  VB12 - H Vitamin B12  (Send-Out)         68685  BDCB2 - H CBC w/o diff  (Send-Out)            Hypertension BP at goal.  She has been stable.  No refills needed.  Checking labs.     LABORATORY:  Labs ordered to be performed today include TSH.            Orders:       05205  TSH - [facility] TSH  (Send-Out)            Hyperlipidemia Checking labs.  She has not tolerated Crestor 40 mg and has been off it for 4 years now.  Need to get her back on statin with her DM and h/o stroke.  Will try decreasing to 10 mg and see if she tolerates it better           Prescriptions:       Refill of: Crestor (Rosuvastatin) 10mg Tablet 1 tab daily  #90 (Ninety) tablet(s) Refills: 1             CHARGE CAPTURE           **Please note: ICD descriptions below are intended for billing purposes only and may not represent clinical diagnoses**        Primary Diagnosis:         250.00 Type 2 diabetes            E11.9    Type 2 diabetes mellitus without complications              Orders:          93577   Office/outpatient visit; new patient, level 4  (In-House)           585.3 Chronic kidney disease, Stage III (moderate)            N18.3    Chronic kidney disease, stage 3 (moderate)    266.2 B12 deficiency            D51.0    Vitamin B12 defic anemia due to intrinsic factor deficiency    401.1 Hypertension            I10    Essential (primary) hypertension    272.4 Hyperlipidemia            E78.2    Mixed hyperlipidemia        ADDENDUMS:      ____________________________________    Addendum: 02/16/2018 09:43 AM - Five, Team         Visit Note Faxed to:        User Entered Recipient; Number [phone]            Addendum: 02/27/2018 11:26 AM - [facility], Team         Visit Note Faxed to:        User Entered Recipient; Number [phone]            Addendum: 03/09/2018 03:30 PM - Nova Menjivar         Visit Note Faxed to:        User Entered  Recipient; Number [phone]     Health Summary Faxed to:        User Entered Recipient; Number [phone]

## 2021-05-18 NOTE — PROGRESS NOTES
"Denisse Malave  1945     Office/Outpatient Visit    Visit Date: Thu, Jan 23, 2020 08:59 am    Provider: Geeta Patterson MD (Assistant: Rosa Arguello MA)    Location: [facility]        Electronically signed by Geeta Patterson MD on  01/23/2020 09:26:38 AM                             Subjective:        CC: Ms. Malave is a 74 year old White female.  Patient presents today for three month follow up;         HPI: Denisse is here today for routine follow-up on chronic issues.        She is on Effexor XR 75 mg for depression.  That has been controlled pretty well.  She did have a spell of crying a while back, thinking about her  and son that she lost.  Otherwise, her mood has been \"okay.\"          She is on metformin, Basaglar, and Humalog (which she uses just once daily in the morning) for DM.  Now following with Ira France.  She is due for labs.  She is UTD on eye exam (last done 9/2019).  She is UTD on foot exam (4/2019).  She is on an ASA, ARB and statin.  Her sugar got really high last week and she fell at home.  She ended up going to the ED to get checked out.  She has been trying to work on adhering to her diabetic diet \"but it's hard because I want to eat what I want.\"        She is on amlodipine and losartan for HTN.  BP has been well controlled when she checks it at home.  No CP, palpitations, SOB.        She is on rosuvastatin for HLD and h/o stroke.  No myalgias.        Multiple falls in the past year.    ROS:     CONSTITUTIONAL:  Negative for fatigue and fever.      EYES:  Negative for blurred vision.      E/N/T:  Positive for diminished hearing and nasal congestion.   Negative for frequent rhinorrhea.      CARDIOVASCULAR:  Negative for chest pain and palpitations.      RESPIRATORY:  Negative for recent cough and dyspnea.      GASTROINTESTINAL:  Negative for abdominal pain, constipation, diarrhea, nausea and vomiting.      MUSCULOSKELETAL:  Positive for arthralgias.   Negative " "for myalgias.      PSYCHIATRIC:  Negative for anxiety, depression and sleep disturbance.          Past Medical History / Family History / Social History:         Last Reviewed on 1/23/2020 09:07 AM by Geeta Patterson    Past Medical History:             PREVENTIVE HEALTH MAINTENANCE             BONE DENSITY: was last done 8/15/18 with the following abnormality noted-- osteoporosis     COLORECTAL CANCER SCREENING: declines colorectal cancer screening, understands reason for testing     EYE EXAM: Diabetic Eye Exam during this calendar year and results are in chart was last done 9/17/19 The next one is due  6 months mild/moderate diabetic retinopathy     Hepatitis C Medicare Screening: was last done 7/10/18     MAMMOGRAM: Done within last 2 years and results in are chart was last done 8/22/2019 with normal results     PAP SMEAR: No longer indicated due to age and history s/p hysterectomy         PAST MEDICAL HISTORY         Positive for    Hyperlipidemia and    Hypertension;     Positive for    Chronic Renal Failure and    Urinary Incontinence;     Positive for    Type 2 Diabetes: complications include nephropathy and peripheral neuropathy; ;     Positive for    Cerebrovascular Accident;     Positive for    Pernicious Anemia;         CURRENT MEDICAL PROVIDERS:    Cardiologist: Dr Munroe    Endocrinologist: Ira France NP    E/N/T: Dr Christianson    Ophthalmologist: Dr Shelby             ADVANCE DIRECTIVES: Durable Power of   has copy         Surgical History:         Biopsy of breast; benign    Cholecystectomy    Hysterectomy: d/t concern for uterine cancer but this was benign;     rotator cuff repair R shoulder;    \"ear surgery\";         Family History:     Father: Coronary Artery Disease     Mother: Lung Cancer     Brother(s): Coronary Artery Disease     Sister(s): Neurological/Genetic Myasthenia Gravis     Son(s): Coronary Artery Disease         Social History:     Occupation:    Retired     Marital " Status:      Children: 2 children 1 , 1 son Francisco Javier         Tobacco/Alcohol/Supplements:     Last Reviewed on 2020 09:07 AM by Geeta Patterson    Tobacco: She has never smoked.          Substance Abuse History:     Last Reviewed on 2020 09:07 AM by Geeta Patterson        Mental Health History:     Last Reviewed on 2020 09:07 AM by Geeta Patterson        Communicable Diseases (eg STDs):     Last Reviewed on 2020 09:07 AM by Geeta Patterson        Current Problems:     Last Reviewed on 10/31/2019 09:02 AM by Geeta Patterson    Vitamin B12 defic anemia due to intrinsic factor deficiency    Chronic kidney disease, stage 3 (moderate)    Personal history of transient ischemic attack (TIA), and cerebral infarction without residual deficits    Essential (primary) hypertension    Mixed hyperlipidemia    Other osteoporosis without current pathological fracture    Benign paroxysmal vertigo, left ear    Anxiety disorder, unspecified    Type 2 diabetes mellitus with hyperglycemia    Type 2 diabetes mellitus with unspecified complications    Repeated falls    Unspecified fall, initial encounter    Encounter for other administrative examinations        Immunizations:     Prevnar 13 (Pneumococcal PCV 13) 2018    Pneumococcal, 23-valent IM/SC (adult and pt >=2yr) 10/31/2019        Allergies:     Last Reviewed on 10/31/2019 09:02 AM by Geeta Patterson    No Known Allergies.        Current Medications:     Last Reviewed on 10/31/2019 09:02 AM by Geeta Patterson    Crestor 10mg Tablet [1 tab daily]    Oxybutynin Chloride 5mg Tablet [1 tab daily]    Metoprolol Succinate 50 mg oral Tablet, Extended Release 24 hr [one tablet po q hs]    Amlodipine  5 mg oral tablet [1 tab daily]    Losartan 100 mg oral tablet [Take 1 tablet(s) by mouth daily]    metFORMIN 1,000 mg oral tablet [1 tab daily]    HumaLOG KwikPen Insulin 100 unit/mL subcutaneous Insulin Pen [20-30 units based on carbs at each meal (patient  assistance)]    Basaglar KwikPen U-100 Insulin 100 unit/mL (3 mL) subcutaneous Insulin Pen [75 units QAM (Patient Assistance)]    Needles (general)  Needle [31g 5mm pen needles, use as directed]    Accu-Chek Mary Plus Test Strips  Reagent Strips [Test Blood Sugar qid DX E11.9]    alendronate 70 mg oral tablet [once a week]    venlafaxine 75 mg oral tablet [take 3 tablets by oral route once daily with food]        Objective:        Vitals:         Current: 1/23/2020 9:05:20 AM    Ht:  5 ft, 3.5 in;  Wt: 166.2 lbs;  BMI: 29.0T: 97.7 F (oral);  BP: 117/86 mm Hg (left arm, sitting);  P: 64 bpm (left arm (BP Cuff), sitting);  sCr: 0.86 mg/dL;  GFR: 60.47        Exams:     PHYSICAL EXAM:     GENERAL: vital signs recorded - well developed, well nourished;  well groomed;  no apparent distress;     EYES: extraocular movements intact; conjunctiva and cornea are normal; PERRLA;     E/N/T: OROPHARYNX:  normal mucosa, dentition, gingiva, and posterior pharynx;     RESPIRATORY: normal respiratory rate and pattern with no distress; normal breath sounds with no rales, rhonchi, wheezes or rubs;     CARDIOVASCULAR: normal rate; rhythm is regular;  no systolic murmur; no edema;     GASTROINTESTINAL: nontender; normal bowel sounds;     MUSCULOSKELETAL: normal gait; normal overall tone     PSYCHIATRIC: appropriate affect and demeanor; normal psychomotor function; normal speech pattern;         Assessment:         F41.9   Anxiety disorder, unspecified       E11.65   Type 2 diabetes mellitus with hyperglycemia       N18.3   CKD - Chronic kidney disease, stage 3 (moderate)       I10   HTN - Essential (primary) hypertension       E78.2   HLD - Mixed hyperlipidemia       Z86.73   H/o stroke - Personal history of transient ischemic attack (TIA), and cerebral infarction without residual deficits       Z13.31   Encounter for screening for depression           ORDERS:         Meds Prescribed:       [Refilled] metFORMIN 1,000 mg oral tablet [1  tab daily], #90 (ninety) tablets, Refills: 0 (zero)         Radiology/Test Orders:       2022F  Dilated retinal eye exam w/interpret by ophthalmologist/optometrist documented/reviewed (DM)4  (In-House)              Lab Orders:       APPTO  Appointment need  (In-House)            [id number]  DIAB[address] [place] [address] LIPID,CMP, A1C: 45212, 39388, 85901  (Send-Out)              Other Orders:         Screening mammogram results documented  (Send-Out)            1124F  Advance Care Planning discussed and doc in MR; no surrogate named or advance care plan provided  (Send-Out)              Depression screen positive and follow up plan documented  (In-House)            1100F  Pt screen for fall risk; document 2+ falls in the past yr or any fall w/injury in past year (MICHAEL)  (In-House)                      Plan:         Anxiety disorder, unspecifiedStable.  No refills needed today.  She feels that the meds are working pretty well in general.  RTC 3 months.    MIPS PHQ-9 Depression Screening: Completed form scanned and in chart; Total Score 14 Positive Depression Screen: Stable on medications. No suicidal ideation.      FOLLOW-UP: Schedule a follow-up visit in 3 months.:.  f/u DM with [facility]          Orders:         Screening mammogram results documented  (Send-Out)            2022F  Dilated retinal eye exam w/interpret by ophthalmologist/optometrist documented/reviewed (DM)4  (In-House)            1124F  Advance Care Planning discussed and doc in MR; no surrogate named or advance care plan provided  (Send-Out)      HAS BUT NOT IN CHART      APPTO  Appointment need  (In-House)              Depression screen positive and follow up plan documented  (In-House)            1100F  Pt screen for fall risk; document 2+ falls in the past yr or any fall w/injury in past year (MICHAEL)  (In-House)              Type 2 diabetes mellitus with hyperglycemiaShe is on metformin, Basaglar, and Humalog (which she uses just once daily in the  "morning) for DM.  Refills sent on metformin.  Now following with Ira France; we will find out when her next appt is there, as she has some questions.  She is due for labs; ordered.  She is UTD on eye exam (last done 9/2019).  She is UTD on foot exam (4/2019).  She is on an ASA, ARB and statin.  She has been trying to work on adhering to her diabetic diet \"but it's hard because I want to eat what I want.\"  Encouraged her to keep trying as this is vital for her health.    LABORATORY:  Labs ordered to be performed today include Diabetes Panel 1; CMP, Lipid, A1C.            Prescriptions:       [Refilled] metFORMIN 1,000 mg oral tablet [1 tab daily], #90 (ninety) tablets, Refills: 0 (zero)           Orders:       [id number]  DIAB - [facility] LIPID,CMP, A1C: 47147, 85678, 04331  (Send-Out)              CKD - Chronic kidney disease, stage 3 (moderate)Checking labs.        HTN - Essential (primary) hypertensionBP at goal.  Continue to monitor.  No refills needed.  Checking labs.        HLD - Mixed hyperlipidemiaNo refills needed.  Checking labs.        H/o stroke - Personal history of transient ischemic attack (TIA), and cerebral infarction without residual deficitsNo refills needed.  Checking labs.            Patient Recommendations:        For  Anxiety disorder, unspecified:    Schedule a follow-up visit in 3 months.                APPOINTMENT INFORMATION:        Monday Tuesday Wednesday Thursday Friday Saturday Sunday            Time:___________________AM  PM   Date:_____________________             Charge Capture:         Primary Diagnosis:     F41.9  Anxiety disorder, unspecified           Orders:      15202  Office/outpatient visit; established patient, level 4  (In-House)            2022F  Dilated retinal eye exam w/interpret by ophthalmologist/optometrist documented/reviewed (DM)4  (In-House)            APPTO  Appointment need  (In-House)              Depression screen positive and follow up plan documented  " (In-House)            1100F  Pt screen for fall risk; document 2+ falls in the past yr or any fall w/injury in past year (MICHAEL)  (In-House)              E11.65  Type 2 diabetes mellitus with hyperglycemia     N18.3  CKD - Chronic kidney disease, stage 3 (moderate)     I10  HTN - Essential (primary) hypertension     E78.2  HLD - Mixed hyperlipidemia     Z86.73  H/o stroke - Personal history of transient ischemic attack (TIA), and cerebral infarction without residual deficits     Z13.31  Encounter for screening for depression

## 2021-05-28 VITALS
WEIGHT: 163 LBS | HEIGHT: 64 IN | BODY MASS INDEX: 30.82 KG/M2 | BODY MASS INDEX: 27.83 KG/M2 | SYSTOLIC BLOOD PRESSURE: 178 MMHG | TEMPERATURE: 98.9 F | DIASTOLIC BLOOD PRESSURE: 82 MMHG | WEIGHT: 173.94 LBS | RESPIRATION RATE: 18 BRPM | HEIGHT: 63 IN

## 2021-05-28 VITALS
SYSTOLIC BLOOD PRESSURE: 194 MMHG | DIASTOLIC BLOOD PRESSURE: 85 MMHG | TEMPERATURE: 97.7 F | HEART RATE: 71 BPM | WEIGHT: 169.06 LBS | HEIGHT: 64 IN | RESPIRATION RATE: 18 BRPM | BODY MASS INDEX: 28.86 KG/M2

## 2021-05-28 NOTE — PROGRESS NOTES
Patient: GERARDO CASEY     Acct: [id number]     Report: #[id number]  UNIT #: [id number]     : 1945    Encounter Date:2019  PRIMARY CARE:   ***Signed***  --------------------------------------------------------------------------------------------------------------------  Encounter Date      Dec 16, 2019            Reason for Visit      The patient returns to the office today for follow-up evaluation for diabetes     medication management.  She is a 74-year-old female patient with uncontrolled     type 2 diabetes complicated by neuropathy.  At the patient's last appointment     she was instructed to discontinue the Humalog 30 units every a.m. and begin     taking 10 to 15 units with each meal 3 times a day based on carbohydrate content    of the meal.  Today she reports that she has not really made any change in her     behavior since last being seen.  She states that she is eating the wrong stuff     including cookies and candies.  She admits to not checking her blood glucose     with any particular frequency.  She states her glucose this morning was 345     mg/dL.  She states that her blood glucose yesterday was reading at 600 mg/dL.      She states that she felt fine despite this number.  This may have been an error     with the meter because the patient still stated she does not always wash her     hands or clean her finger before testing.  The patient does say that she is     taking the Humalog 30 units twice a day which is concerning considering that she    had frequent hypoglycemia previously.  The patient states that she thinks that     her bad behavior is related to people bring her holiday treats and just coping     with the holidays with her depression and anxiety.            History            History: She denies any health issues or changes since last being seen.  The     skin cancer on her right shin is showing improved healing.            Allergies/Medications      Allergies:         Coded Allergies:             NO KNOWN ALLERGIES (Unverified , 12/30/19)      Medications    Last Reconciled on 12/30/19 10:57 am by MIKAYLA REID      Oxybutynin Chloride (Oxybutynin Chloride) 5 Mg Tablet      5 MG PO QDAY, TAB         Reported         12/30/19       Venlafaxine Xr (VENLAFAXINE XR) 225 Mg Tab.er.24      225 MG PO QDAY, #30 TAB         Reported         12/30/19       Metoprolol Succinate (Metoprolol Succinate) 50 Mg Tab.er.24h      50 MG PO QDAY, #30 TAB.SR.24H 0 Refills         Reported         12/30/19       Rosuvastatin Calcium (Crestor*) 10 Mg Tablet      10 MG PO HS, #30 TAB 0 Refills         Reported         12/30/19       Losartan Potassium (Losartan*) 100 Mg Tablet      100 MG PO QDAY, #30 TAB 0 Refills         Reported         12/30/19       Insulin Lispro (HumaLOG KWIKPEN 100 UNITS/ML) 100 Units/Ml Insuln.pen      30 UNITS SUBQ QAM, #1 BOX 0 Refills         Reported         12/30/19       Insulin Glargine,Hum.rec.anlog (Basaglar Kwikpen U-100) 100 Unit/1 Ml Insuln.pen      74 UNITS SUBQ QDAY, #1 INSULN.PEN         Reported         12/30/19       metFORMIN HCl (metFORMIN HCl) 1,000 Mg Tablet      1000 MG PO QDAY for 30 Days, #30 TAB         Reported         12/30/19            EXAM      EXAM: Skin examination shows healing wound of the right shin            Quality Measures      Current yr A1c more than 9:  Yes      Pos ua mAlb this yr in chart:  Yes            Impression      Type 2 diabetes uncontrolled with diabetic neuropathy, skin cancer right shin            Plan      Once more a lengthy discussion was held with the patient regarding the     importance of complying with her diet, medications, and monitoring.  The patient    admits that she needs to start focusing on taking better care of herself.  She     feels that she needs to get through the holidays and refocus.  The instructions     from her last appointment were reinforced with the patient.  Again she is to     take Humalog  10 to 15 units with each meal based on carbohydrate content.  She     is to monitor her glucose 3-4 times every day.  She will be scheduled for a     follow-up appointment in 6 to 8 weeks.            Total time spent with patient was 20 minutes of which half of that time was spe    nt counseling the patient regarding strategies to improve compliance            Patient Education      Yes            PREVENTION      Hx Influenza Vaccination:  No (States she does not take the flu vaccination)      Influenza Vaccine Declined:  No      Hx Pneumococcal Vaccination:  Yes      Encouraged to follow-up with:  PCP regarding preventative exams.            Electronically signed by MIKAYLA REID  12/30/2019 10:57       Disclaimer: Converted document may not contain table formatting or lab diagrams. Please see [employer] for the authenticated document.

## 2021-05-28 NOTE — PROGRESS NOTES
Patient: GEARRDO CASEY     Acct: [id number]     Report: #[id number]  UNIT #: [id number]     : 1945    Encounter Date:2020  PRIMARY CARE:   ***ISiiam***  --------------------------------------------------------------------------------------------------------------------  Date of Encounter      2020            Chief Complaint      DIABETES MELLITUS TYPE II            Allergies      Coded Allergies:             NO KNOWN ALLERGIES (Unverified , 19)            Medications      Last Reconciled on 20 2:11 pm by MIKAYLA REID      amLODIPine (amLODIPine) 2.5 Mg Tablet      5 MG PO QDAY, #60 TAB 0 Refills         Reported         20       Alendronate Sodium (Alendronate) 70 Mg Tablet      70 MG PO Fr, #4 TAB         Reported         20       Oxybutynin Chloride (Oxybutynin Chloride) 5 Mg Tablet      5 MG PO QDAY, TAB         Reported         19       Venlafaxine Xr (VENLAFAXINE XR) 225 Mg Tab.er.24      225 MG PO QDAY, #30 TAB         Reported         19       Metoprolol Succinate (Metoprolol Succinate) 50 Mg Tab.er.24h      50 MG PO QDAY, #30 TAB.SR.24H 0 Refills         Reported         19       Rosuvastatin Calcium (Crestor*) 10 Mg Tablet      10 MG PO HS, #30 TAB 0 Refills         Reported         19       Losartan Potassium (Losartan*) 100 Mg Tablet      100 MG PO QDAY, #30 TAB 0 Refills         Reported         19       Insulin Lispro (HumaLOG KWIKPEN 100 UNITS/ML) 100 Units/Ml Insuln.pen      30 UNITS SUBQ QAM, #1 BOX 0 Refills         Reported         19       Insulin Glargine,Hum.rec.anlog (Basaglar Kwikpen U-100) 100 Unit/1 Ml Insuln.pen      75 UNITS SUBQ QDAY, #1 INSULN.PEN         Reported         19       metFORMIN HCl (metFORMIN HCl) 1,000 Mg Tablet      1000 MG PO QDAY for 30 Days, #30 TAB         Reported         19            Vital Signs      Height 5 ft 3 in / 160.02 cm      Weight 173 lbs 15.087 oz / 78.9  kg      BSA 1.82 m2      BMI 30.8 kg/m2      Temperature 98.9 F / 37.17 C - Temporal      Respirations 18      Blood Pressure 178/82 Sitting, Right Arm            Preventative      Hx Influenza Vaccination:  No      Influenza Vaccine Declined:  Yes      Have You Had 2 or More Falls i:  Yes      Have You Had a Fall with an In:  Yes      Hx Pneumococcal Vaccination:  No      Encouraged to follow-up with:  PCP regarding preventative exams.      Chart initiated by:      Skyla Guerin MA            HPI - Diabetes      This patient is seen in the office today for follow-up evaluation for diabetes     medication management.  She is a 75-year-old female patient with a history of     type 2 diabetes uncontrolled.  The patient is currently managed on metformin     1000 mg twice a day,  basalglar 75 units each day, and Humalog 30 units with     meals.  The patient states that she is having lots of fluctuations in her blood     sugars.  She reports they are in the 70s to 300s.  She reports that she     typically only eats 1 meal a day but as the conversation progressed if she did     admit to eating additional small meals.  She states that her sister will bring     her supper on most days and she will eat something light in between.  The     patient expresses interested in an insulin pump however I feel that this will be    challenging for this patient at this particular time.  Her sister currently uses    an insulin pump and this is the basis of her interest however as our discussion     about the device continued the patient was unaware of the many responsibilities     that go along with use of a pump.  It was also explained Medicare requirements     to qualify him for a pump.            Most Recent Lab Findings      Most Recent HGA1C      The most recent A1c available on this patient was collected on January 23, 2020     and was 10.8% indicating uncontrolled type 2 diabetes.            Item Value  Date Time              Hemoglobin A1c 10.8 % H 1/23/20 0941            Diabetes Complications:  Neuropathy      Number of Years?:  12      Hospitalizations 2nd to DM?:  No      ER/911 Secondary to DM:  No      Dietary Habits:  2 meals daily (Sometimes one meal plus snacks), Diet Drinks      Exercise:  None      BG Monitoring:  Meter      Frequency:  Times per day (1-3 times a day)      Blood Glucose Range:        Glucose levels range from 70 to greater than 300            PAST,FAMILY,   Past Medical History      Past Medical History:  Arthritis (Hands and feet and other joints), Cancer (Skin    cancer of the right shin), Hyperlipidemia, Kidney Disease (Renal stones), Stroke      Other Medical History      Vertigo, deafness of the left ear            Past Surgical History      Past Surgical History:  Bariatric Surgery (Gastric lap band), Cataract Surgery     (Bilateral), Cholecystectomy, Hysterectomy      Other Past Surgical History      Parathyroidectomy            Social History      Marrital Status:        Lives independently:  Yes      Number of Children:  2      Occupation:  Retired       Other Social History      She relies heavily on her siblings for assistance with care            Tobacco Use      Smoking status:  Never smoker            Vaping      Currently Vaping:  No            Alcohol Use      None            Substance Use      Substance Use:  Denies Use            Review of Sytems      General:  Appetite Changes (Her appetite is somewhat poor; she does very little     cooking for herself), Fatigue (She complains of feeling tired all the time); No     Weight Loss, No Weight Gain      Eyes:  Negative for Blurred Vision, Negative for Corrective Lenses, Negative for    Vision Changes      Cardiovascular:  No Chest Pain, No Palpitations      Gastrointestinal:  No Constipation, No Diarrhea, No Nausea/Vomiting      Integumentary:  No Lesions, No Rash, No Wounds      Neurologic:  No Extremity Pain, No Numbness, No  Tingling      Psychiatric:  Anxiety, Depression      Endocrine:  Hypoglycemia (Glucose levels are widely fluctuating with occasional     episodes of hypoglycemia); No Hypo Unawareness, No Nocturnal Hypo, No Polyuria,     No Polyphagia, No Polydipsia            Exam      General:  Awake and Alert, Appropriately dressed/groomed, No Acute Distress,       Eyes:  Sclera Non-Icteric, EOM Intact, Eyelids without swelling,       Cardiovascular:  No Peripheral Edema, Normal Chest Appearance,       Musculoskeletal:  Steady Gait (She is a little unsteady on her feet upon     arising); No Normal Strength, No Normal ROM, No       Respiratory:  No Respiratory Distress, No Cyanosis, No use of Accessory Musc,       Skin:  No Blisters, No Cuts\Scrapes, No Acanthosis Nigricans, No DM Dermopathy,     No Fungus, No NLD, No Rash, No Vitiligo      Psychiatric:  Appropriate Affect, Intact Judgement (In some of the discussion it    seems the patient has given up on her health), Oriented x 3,             Impression      Type 2 diabetes uncontrolled, arthritis, hyperlipidemia, history of skin cancer,    history of stroke, deafness of left ear            Diagnosis      TYPE 2 DIABETES MELLITUS WITH HYPERGLYCEMIA - E11.65            Notes      New Diagnostics      * Hemoglobin A1c, Routine         Dx: TYPE 2 DIABETES MELLITUS WITH HYPERGLYCEMIA - E11.65      * Micro Alb UA H, Routine         Dx: TYPE 2 DIABETES MELLITUS WITH HYPERGLYCEMIA - E11.65            Plan      At this time after discussion the patient has elected not to do an insulin pump.     Based on this decision and we elected to change her current Humalog insulin     dosing plan.  She will continue taking her Lantus 75 units every day as     previously prescribed but will begin taking the Humalog with a mealtime dose of     15, 20, or 25 units based on the size of the meal carbohydrate content from     small, medium, to large.  Additionally she will add a correction dose for      glucose levels greater than 150 starting with 2 units for glucose levels 151-175    and increasing by 1 unit for every 25 mg/dL thereafter.  Written instructions     were given and reviewed with the patient.  She is to monitor her glucose levels     3-4 times each day and log them for review at her follow-up appointment.  We     will collect a hemoglobin A1c and urine microalbumin.  She will be scheduled for    follow-up evaluation in 1 month.            Patient Education      Patient Education Provided:  Yes            Topics Patient Counseled on      Meds, Monitoring, Diet            Electronically signed by MIKAYLA REID  06/14/2020 14:19       Disclaimer: Converted document may not contain table formatting or lab diagrams. Please see [employer] for the authenticated document.

## 2021-05-28 NOTE — PROGRESS NOTES
Patient: GERARDO CASEY     Acct: [id number]     Report: #[id number]  UNIT #: [id number]     : 1945    Encounter Date:2020  PRIMARY CARE:   ***ISiiam***  --------------------------------------------------------------------------------------------------------------------  Date of Encounter      Sep 4, 2020            Chief Complaint      DIABETES MELLITUS TYPE II            Allergies      Coded Allergies:             NO KNOWN ALLERGIES (Unverified , 19)            Medications      Last Reconciled on 20 2:11 pm by MIKAYLA REID      amLODIPine (amLODIPine) 2.5 Mg Tablet      5 MG PO QDAY, #60 TAB 0 Refills         Reported         20       Alendronate Sodium (Alendronate) 70 Mg Tablet      70 MG PO Fr, #4 TAB         Reported         20       Oxybutynin Chloride (Oxybutynin Chloride) 5 Mg Tablet      5 MG PO QDAY, TAB         Reported         19       Venlafaxine Xr (VENLAFAXINE XR) 225 Mg Tab.er.24      225 MG PO QDAY, #30 TAB         Reported         19       Metoprolol Succinate (Metoprolol Succinate) 50 Mg Tab.er.24h      50 MG PO QDAY, #30 TAB.SR.24H 0 Refills         Reported         19       Rosuvastatin Calcium (Crestor*) 10 Mg Tablet      10 MG PO HS, #30 TAB 0 Refills         Reported         19       Losartan Potassium (Losartan*) 100 Mg Tablet      100 MG PO QDAY, #30 TAB 0 Refills         Reported         19       Insulin Lispro (HumaLOG KWIKPEN 100 UNITS/ML) 100 Units/Ml Insuln.pen      30 UNITS SUBQ QAM, #1 BOX 0 Refills         Reported         19       Insulin Glargine,Hum.rec.anlog (Basaglar Kwikpen U-100) 100 Unit/1 Ml Insuln.pen      75 UNITS SUBQ QDAY, #1 INSULN.PEN         Reported         19       metFORMIN HCl (metFORMIN HCl) 1,000 Mg Tablet      1000 MG PO QDAY for 30 Days, #30 TAB         Reported         19            Vital Signs      Height 5 ft 3.50 in / 161.29 cm      Weight 169 lbs 1.000 oz /  76.798480 kg      BSA 1.81 m2      BMI 29.5 kg/m2      Temperature 97.7 F / 36.5 C - Temporal      Pulse 71      Respirations 18      Blood Pressure 194/85 Sitting, Right Arm            Eye Exam      When was your last eye exam?:        2019      Where was it done?:        Dr. Shelby            Pain Score      Experiencing any pain?:  Yes      Pain Comment:        All over            Preventative      Hx Influenza Vaccination:  No      Influenza Vaccine Declined:  Yes      Have You Had 2 or More Falls i:  Yes      Have You Had a Fall with an In:  Yes (crushed elbow)      Hx Pneumococcal Vaccination:  No      Encouraged to follow-up with:  PCP regarding preventative exams.      Chart initiated by:      Skyla Guerin MA            HPI - Diabetes      This patient is seen in the office today for follow-up evaluation diabetes     medication management.  5-year-old female patient with a history of type 2     diabetes uncontrolled.  Her diabetes is complicated by diabetic nephropathy with    mild microalbuminuria and diabetic neuropathy the patient's primary complaint of    neuropathy is related to lower extremity and foot pain.  She states she feels     like her symptoms have gotten worse lately.  At the patient's last appointment     she was instructed to take Basaglar 75 units every day and Humalog with a     mealtime dose of 15, 20, or 25 units based on the size of the meal carbohydrate     content from small, medium, to large as well as correction dose for glucose     levels greater than 150 starting with 2 units for glucose levels 151-175 and     increasing by 1 unit for every 25 mg/dL thereafter.              Patient states she takes her Basaglar faithfully every day but she is only     taking the Humalog if she eats a really large meal.  She states her sister will     sometimes prepare a meal and bring it to her when she eats these meals she will     take 30 units of the Humalog otherwise she tends to snack and  graze throughout     the day and does not take insulin.  She states that her appetite is pretty poor     and she just does not feel like eating sometimes.  The patient also admits to     eating a lots of ice cream bars recently which she states accounts for the rise     in her A1c.  She states that she became obsessed with these ice cream bars and     would sometimes eat 3 or 4 at a time.  She states that she stopped this behavior    approximately 2 weeks ago and no longer buys them because she feels she has no     self-control.            The patient states that she has recently been having some dizziness with     staggering and an occasional fall.  She had an MRI done.  The MRI report was     reviewed and indicates the patient has some atrophic changes in the brain which     may be related to the aging process.  There was nothing indicating any acute     changes in the report.             Additionally, the patient has experienced recent change in her hearing.  She     received a hearing aid but somehow when the hearing aid was inserted into her     right ear it caused a rupture of the eardrum and she is following up with ENT     specialist for this issue.            Most Recent Lab Findings      Most Recent HGA1C      Her most recent A1c was collected on August 5, 2020 and was 9.9% indicating     uncontrolled type 2 diabetes.  In January of this year her A1c was 10.8 and she     was able to lower it through diet and medication to 8.8% in June but has now had    an increase in the A1c.            Item Value  Date Time             Hemoglobin A1c 9.9 % H 8/5/20 1123            Item Value  Date Time             Urine Random Microalbumin 68.2 mg/L H 8/5/20 1123            Diabetes Type:  Type 2      Diabetes Complications:  Nephropathy (mild microalbuminuria), Neuropathy     (patient states symptoms are worse)      Number of Years?:  12      Hospitalizations 2nd to DM?:  No      ER/911 Secondary to DM:  No      Dietary  Habits:  1 meal daily, Snacks (She has been eating a lot of ice cream     bars)      BG Monitoring:  Meter      Frequency:  Times per day (1 time a day sometimes more)      Blood Glucose Range:                    PAST,FAMILY,   Past Medical History      Past Medical History:  Arthritis, Cancer, Hyperlipidemia, Kidney Disease, Stroke            Past Surgical History      Past Surgical History:  Bariatric Surgery, Cataract Surgery, Cholecystectomy,     Hysterectomy            Social History      Lives independently:  Yes      Occupation:  Retired             Tobacco Use      Smoking status:  Never smoker            Alcohol Use      None            Substance Use      Substance Use:  Denies Use            Review of Sytems      General:  No Appetite Changes; Fatigue, Weight Loss (The patient has had a 4     pound weight loss since her last appointment); No Weight Gain      Eyes:  Positive for Blurred Vision (She states her vision is blurry all the     time; she has an appointment with her ophthalmologist scheduled), Positive for     Corrective Lenses; Negative for Vision Changes      Cardiovascular:  No Chest Pain, No Palpitations      Gastrointestinal:  Constipation, Diarrhea, Nausea/Vomiting      Integumentary:  No Lesions, No Rash, No Wounds      Neurologic:  Extremity Pain (She complains of worsening foot pain), Numbness     (Feet), Tingling (Feet)      Psychiatric:  No Anxiety, No Depression      Endocrine:  Hypoglycemia (Very rare); No Hypo Unawareness, No Nocturnal Hypo;     Polyuria, Polyphagia, Polydipsia (She is complaining of excessive thirst at     today's appointment)            Exam      General:  Awake and Alert, Appropriately dressed/groomed, No Acute Distress,       Eyes:  Sclera Non-Icteric, EOM Intact, Eyelids without swelling,       Cardiovascular:  No Peripheral Edema, Normal Chest Appearance, Hear Tones Normal    S1 S2,       Musculoskeletal:  Steady Gait (She is slightly unsteady  on her feet), Normal     Strength, Normal ROM,       Respiratory:  No Respiratory Distress, No Cyanosis, No use of Accessory Musc,       Skin:  No Blisters, No Cuts\Scrapes, No Acanthosis Nigricans, No DM Dermopathy,     No Fungus, No NLD, No Rash, No Vitiligo      Psychiatric:  Appropriate Affect, Intact Judgement, Oriented x 3,       Right Foot:  Bunions, Pedal Pulses Present, Swelling (mild swelling), Other     (Well-healed skin cancer wound on the shin; dry flaky skin on the feet and     shins)      Monofilament Exam Right Foot:  Loss Great Toe Sensation (diminished), Other     (hyperssensitive response to monofilament test on the plantar surface of the     foot)      Left Foot:  Bunions, Pedal Pulses Present, Other (She has dry flaky skin on the     left foot and shin)      Monofilament Exam Left Foot:  Loss of 1st MT Sensation, Loss of 3rd MT Sensation    (diminished), Loss of 5th MT Sensation (diminished), Loss Great Toe Sensation            Impression      Type 2 diabetes uncontrolled with diabetic neuropathy, arthritis,     hyperlipidemia, microalbuminuria, history of stroke            Diagnosis      TYPE 2 DIABETES MELLITUS WITH HYPERGLYCEMIA - E11.65            TYPE 2 DIABETES MELLITUS WITH DIABETIC NEUROPATHY, UNSP - E11.40            Arthritis - M19.90            Hyperlipidemia - E78.5            Microalbuminuria - R80.9            Plan      Lengthy discussion was held with the patient regarding the importance of dietary    compliance as well as compliance with her medications.  The patient needs to     improve her diet and was encouraged to eat 3 meals a day even if those meals are    small meals.  She was also encouraged to minimize eating things such as the ice     cream.  She was also instructed that she should be taking her Humalog insulin     when consuming carbohydrates.  She has been previously instructed on using a     small, medium, large dosing plan for her meals as well as sliding scale      correction.  She states that she has a copy of these instructions from her past     visit at home.  She is to focus on dietary behavior and taking insulin each time    she eats carbohydrates as this was successful in lowering her A1c in the past.      In the meantime she is encouraged to increase her testing to 3 times a day.  She    will be scheduled for a follow-up appointment in this office in 2 months for     reevaluation.            Pain Plan      Follow-up with PCP/Pain Management            Patient Education      Patient Education Provided:  Yes            Topics Patient Counseled on      Meds, Diet            Electronically signed by MIKAYLA REID  09/04/2020 14:12       Disclaimer: Converted document may not contain table formatting or lab diagrams. Please see [employer] for the authenticated document.

## 2021-05-28 NOTE — PROGRESS NOTES
Patient: GERARDO CASEY     Acct: [id number]     Report: #[id number]  UNIT #: [id number]     : 1945    Encounter Date:2019  PRIMARY CARE:   ***Signed***  --------------------------------------------------------------------------------------------------------------------  Encounter Date      2019            Reason for Visit      This patient is seen in the office today for evaluation for diabetes medication     management.  She is a 74-year-old female patient with a 10 to 12-year history of    type 2 diabetes.  The patient is referred to our office by her primary care     provider Dr. Tucker.  Despite multiple treatment options the patient continues     to have an A1c of 10.5%.  She is currently being treated with basalglar 74 units    in the morning, Humalog 30 units in the morning and additional 30 units if     eating a large meal later in the day, and metformin 1000 mg once a day.  The pa    louie admits to very poor dietary behavior as well as very limited physical     activity.  She states that she does not take her medications properly and she     does not always check her blood sugars.  She denies any hospitalizations or     emergency department visits due to her diabetes.            Lab Results            Item Value  Date Time             Hemoglobin A1c 10.5 % H 19 1056             Urine Random Microalbumin 49.4 mg/L H 19 1056            History and Physical      Diabetes Assessment      DIET: She states she does not eat right, she skips meals, she states that she     does not want to cook and at most occasions she eats what she wants      EXERCISE: She has no exercise plan      BG MONITORING: She states that she will test her blood sugar sometimes 0 times     and up to 5 times in a day depending on how she feels; she reports most glucose     levels are in the 300s when she checks them            Allergies/Medications      Allergies:        Coded Allergies:              NO KNOWN ALLERGIES (Unverified , 12/30/19)      Medications    Last Reconciled on 12/30/19 10:48 am by MIKAYLA REID      Oxybutynin Chloride (Oxybutynin Chloride) 5 Mg Tablet      5 MG PO QDAY, TAB         Reported         12/30/19       Venlafaxine Xr (VENLAFAXINE XR) 225 Mg Tab.er.24      225 MG PO QDAY, #30 TAB         Reported         12/30/19       Metoprolol Succinate (Metoprolol Succinate) 50 Mg Tab.er.24h      50 MG PO QDAY, #30 TAB.SR.24H 0 Refills         Reported         12/30/19       Rosuvastatin Calcium (Crestor*) 10 Mg Tablet      10 MG PO HS, #30 TAB 0 Refills         Reported         12/30/19       Losartan Potassium (Losartan*) 100 Mg Tablet      100 MG PO QDAY, #30 TAB 0 Refills         Reported         12/30/19       Insulin Lispro (HumaLOG KWIKPEN 100 UNITS/ML) 100 Units/Ml Insuln.pen      30 UNITS SUBQ QAM, #1 BOX 0 Refills         Reported         12/30/19       Insulin Glargine,Hum.rec.anlog (Basaglar Kwikpen U-100) 100 Unit/1 Ml Insuln.pen      74 UNITS SUBQ QDAY, #1 INSULN.PEN         Reported         12/30/19       metFORMIN HCl (metFORMIN HCl) 1,000 Mg Tablet      1000 MG PO QDAY for 30 Days, #30 TAB         Reported         12/30/19            Review of Systems      CONSTITUTIONAL: Patient reports 15 pound weight loss over the last few months,     denies weakness, admits to daily fatigue      ENDOCRINOLOGIC: Admits to polyuria and polydipsia, but denies polyphagia; admits    to hypoglycemia occurring 3-4 times each week in the morning after taking her     Humalog; denies hypoglycemia unawareness or nocturnal hypoglycemia      SKIN: Positive for psoriasis      NEUROLOGICAL: Denies numbness or tingling in the extremities but admits to pain     in the feet      EYES: Admits to some blurred vision      PSYCHIATRIC: Admits to depression and anxiety due to the loss of her  and    son            EXAM      EXAM: Skin examination shows random plaque-like areas related to psoriasis; she      also has a a red scabbed over area on the right shin which the patient reports     as skin cancer that has been treated recently            Blood Glucose: 97      HT/WT/BMI:             Height 5 ft 3.5 in / 161.29 cm           Weight 163 lbs  / 73.738027 kg           BSA 1.78 m2           BMI 28.4 kg/m2            [facility]      Family History: Father was positive for type 2 diabetes and several siblings     also have diabetes            Past Medical History: Positive for CVA, renal stones, arthritis of the hands and    feet, joint pain, diabetic neuropathy, skin cancer of the right shin, vertigo,     hyperlipidemia, left ear deafness, skin cancer of the right shin            Past Surgical History: Gastric lap band, removal bilateral cataracts, removal of    parathyroid, cholecystectomy, hysterectomy            Psychosocial History: She is a retired ; she is  and lives     by herself; she has 1 living son; she denies use of alcohol, tobacco, or     recreational drugs            Quality Measures      Current yr A1c more than 9:  Yes      Pos ua mAlb this yr in chart:  Yes            Impression      Type 2 diabetes complicated by neuropathy, hypertension, arthritis, joint pain,     skin cancer of the right shin, hyperlipidemia, depression, anxiety            Plan      A lengthy discussion was held with the patient regarding her current behavior     related to managing her diabetes specifically discussing her diet.  The patient     has such an irregular diet that it is probably the cause of her frequent     episodes of hypoglycemia.  She states that most mornings she only eats oatmeal     for breakfast get she takes 30 units of Humalog and is having hypoglycemia 4-5     times each week.  The patient states that she relies on family to help her with     meals at times.  We discussed the importance of trying to eat balanced meals 3     times a day plus snacks when needed.  The patient also needs to  improve her     blood glucose monitoring behavior.  It was emphasized the importance of always     testing her blood glucose before taking doses of insulin.  But, she was     encouraged to test at least 4 times each day.  At this time the patient was     instructed to continue taking the Basaglar 74 units every day.  She is to stop     the Humalog 30 units in the morning and begin taking 10 to 15 units of Humalog     before each meal based on carbohydrate volume of the meal.  We discussed     carbohydrate counting and how to adjust the insulin dose based on this.  The     patient will return to the office in 1 month for review of glucose logs and     reinforcement of the previously discussed information.  We will consider     increasing her Basaglar if necessary at that time.            Total time spent with patient was 40 minutes of which half of that time was     spent counseling the patient regarding diet, medications, and monitoring            Patient Education      Yes      ACO BMI High above 25:  Counseling Given, Encourage dietary changes            PREVENTION      Hx Influenza Vaccination:  No (States she does not take the flu vaccination)      Influenza Vaccine Declined:  No      2 or More Falls Past Year?:  No      Fall Past Year with Injury?:  No      Hx Pneumococcal Vaccination:  Yes      Encouraged to follow-up with:  PCP regarding preventative exams.            Electronically signed by MIKAYLA REID  12/30/2019 10:48       Disclaimer: Converted document may not contain table formatting or lab diagrams. Please see [facility] for the authenticated document.

## 2021-07-01 VITALS
BODY MASS INDEX: 29.53 KG/M2 | DIASTOLIC BLOOD PRESSURE: 84 MMHG | HEART RATE: 89 BPM | TEMPERATURE: 97.9 F | HEIGHT: 64 IN | WEIGHT: 173 LBS | SYSTOLIC BLOOD PRESSURE: 106 MMHG

## 2021-07-01 VITALS
BODY MASS INDEX: 29.19 KG/M2 | WEIGHT: 171 LBS | TEMPERATURE: 98.1 F | HEART RATE: 83 BPM | SYSTOLIC BLOOD PRESSURE: 155 MMHG | DIASTOLIC BLOOD PRESSURE: 68 MMHG | HEIGHT: 64 IN

## 2021-07-01 VITALS
TEMPERATURE: 98.2 F | WEIGHT: 175.2 LBS | SYSTOLIC BLOOD PRESSURE: 155 MMHG | HEIGHT: 64 IN | HEART RATE: 62 BPM | DIASTOLIC BLOOD PRESSURE: 63 MMHG | BODY MASS INDEX: 29.91 KG/M2

## 2021-07-01 VITALS
TEMPERATURE: 97.9 F | BODY MASS INDEX: 29.3 KG/M2 | DIASTOLIC BLOOD PRESSURE: 63 MMHG | WEIGHT: 171.6 LBS | SYSTOLIC BLOOD PRESSURE: 143 MMHG | HEART RATE: 67 BPM | HEIGHT: 64 IN

## 2021-07-01 VITALS
SYSTOLIC BLOOD PRESSURE: 152 MMHG | DIASTOLIC BLOOD PRESSURE: 70 MMHG | BODY MASS INDEX: 29.5 KG/M2 | HEIGHT: 64 IN | TEMPERATURE: 98 F | HEART RATE: 65 BPM | WEIGHT: 172.8 LBS

## 2021-07-01 VITALS
DIASTOLIC BLOOD PRESSURE: 80 MMHG | HEART RATE: 70 BPM | WEIGHT: 169 LBS | HEIGHT: 64 IN | TEMPERATURE: 97.2 F | BODY MASS INDEX: 28.85 KG/M2 | SYSTOLIC BLOOD PRESSURE: 152 MMHG

## 2021-07-01 VITALS
DIASTOLIC BLOOD PRESSURE: 77 MMHG | HEART RATE: 75 BPM | HEIGHT: 64 IN | TEMPERATURE: 97 F | SYSTOLIC BLOOD PRESSURE: 134 MMHG | WEIGHT: 177.9 LBS | BODY MASS INDEX: 30.37 KG/M2

## 2021-07-01 VITALS
DIASTOLIC BLOOD PRESSURE: 70 MMHG | TEMPERATURE: 97.5 F | BODY MASS INDEX: 30.8 KG/M2 | SYSTOLIC BLOOD PRESSURE: 123 MMHG | WEIGHT: 180.4 LBS | HEIGHT: 64 IN | HEART RATE: 83 BPM

## 2021-07-01 VITALS
HEART RATE: 73 BPM | DIASTOLIC BLOOD PRESSURE: 70 MMHG | HEIGHT: 64 IN | SYSTOLIC BLOOD PRESSURE: 123 MMHG | WEIGHT: 181.7 LBS | TEMPERATURE: 97 F | BODY MASS INDEX: 31.02 KG/M2

## 2021-07-01 VITALS
DIASTOLIC BLOOD PRESSURE: 80 MMHG | HEART RATE: 68 BPM | WEIGHT: 184 LBS | SYSTOLIC BLOOD PRESSURE: 144 MMHG | HEIGHT: 64 IN | TEMPERATURE: 97.1 F | BODY MASS INDEX: 31.41 KG/M2

## 2021-07-01 VITALS
DIASTOLIC BLOOD PRESSURE: 78 MMHG | HEART RATE: 64 BPM | HEIGHT: 64 IN | WEIGHT: 176.2 LBS | BODY MASS INDEX: 30.08 KG/M2 | SYSTOLIC BLOOD PRESSURE: 160 MMHG | TEMPERATURE: 98.7 F

## 2021-07-01 VITALS
TEMPERATURE: 97.3 F | BODY MASS INDEX: 28.71 KG/M2 | HEIGHT: 64 IN | SYSTOLIC BLOOD PRESSURE: 160 MMHG | HEART RATE: 63 BPM | DIASTOLIC BLOOD PRESSURE: 81 MMHG | WEIGHT: 168.2 LBS

## 2021-07-01 VITALS
TEMPERATURE: 97.7 F | HEIGHT: 64 IN | BODY MASS INDEX: 29.26 KG/M2 | HEART RATE: 63 BPM | WEIGHT: 171.4 LBS | SYSTOLIC BLOOD PRESSURE: 156 MMHG | DIASTOLIC BLOOD PRESSURE: 91 MMHG

## 2021-07-01 VITALS
HEART RATE: 66 BPM | HEIGHT: 64 IN | BODY MASS INDEX: 29.74 KG/M2 | SYSTOLIC BLOOD PRESSURE: 166 MMHG | WEIGHT: 174.2 LBS | TEMPERATURE: 98.4 F | DIASTOLIC BLOOD PRESSURE: 67 MMHG

## 2021-07-02 VITALS
WEIGHT: 170.2 LBS | HEIGHT: 64 IN | TEMPERATURE: 98.6 F | SYSTOLIC BLOOD PRESSURE: 179 MMHG | HEART RATE: 68 BPM | BODY MASS INDEX: 29.06 KG/M2 | DIASTOLIC BLOOD PRESSURE: 78 MMHG

## 2021-07-02 VITALS
TEMPERATURE: 97.7 F | BODY MASS INDEX: 28.38 KG/M2 | SYSTOLIC BLOOD PRESSURE: 117 MMHG | HEIGHT: 64 IN | WEIGHT: 166.2 LBS | DIASTOLIC BLOOD PRESSURE: 86 MMHG | HEART RATE: 64 BPM

## 2021-07-02 VITALS
SYSTOLIC BLOOD PRESSURE: 194 MMHG | TEMPERATURE: 97.8 F | HEIGHT: 64 IN | DIASTOLIC BLOOD PRESSURE: 91 MMHG | WEIGHT: 172 LBS | HEART RATE: 82 BPM | BODY MASS INDEX: 29.37 KG/M2

## 2021-07-02 VITALS
DIASTOLIC BLOOD PRESSURE: 76 MMHG | HEART RATE: 70 BPM | BODY MASS INDEX: 28.71 KG/M2 | TEMPERATURE: 98.4 F | WEIGHT: 168.2 LBS | SYSTOLIC BLOOD PRESSURE: 185 MMHG | HEIGHT: 64 IN

## 2021-07-02 VITALS
WEIGHT: 172.6 LBS | DIASTOLIC BLOOD PRESSURE: 67 MMHG | HEIGHT: 64 IN | BODY MASS INDEX: 29.47 KG/M2 | TEMPERATURE: 96.9 F | HEART RATE: 66 BPM | SYSTOLIC BLOOD PRESSURE: 145 MMHG

## 2021-07-02 VITALS
HEIGHT: 64 IN | WEIGHT: 164.8 LBS | SYSTOLIC BLOOD PRESSURE: 176 MMHG | BODY MASS INDEX: 28.1 KG/M2 | DIASTOLIC BLOOD PRESSURE: 82 MMHG | HEART RATE: 72 BPM | BODY MASS INDEX: 28.13 KG/M2 | HEIGHT: 64 IN | TEMPERATURE: 97.6 F | HEART RATE: 80 BPM | SYSTOLIC BLOOD PRESSURE: 139 MMHG | TEMPERATURE: 98.5 F | DIASTOLIC BLOOD PRESSURE: 57 MMHG | WEIGHT: 164.6 LBS

## 2021-07-02 VITALS
WEIGHT: 171.2 LBS | HEIGHT: 64 IN | BODY MASS INDEX: 29.23 KG/M2 | SYSTOLIC BLOOD PRESSURE: 147 MMHG | TEMPERATURE: 98 F | HEART RATE: 78 BPM | DIASTOLIC BLOOD PRESSURE: 74 MMHG

## 2021-07-22 RX ORDER — FLURBIPROFEN SODIUM 0.3 MG/ML
SOLUTION/ DROPS OPHTHALMIC
Qty: 100 EACH | Refills: 2 | Status: SHIPPED | OUTPATIENT
Start: 2021-07-22

## 2021-08-25 RX ORDER — ROSUVASTATIN CALCIUM 10 MG/1
TABLET, COATED ORAL
Qty: 30 TABLET | OUTPATIENT
Start: 2021-08-25

## 2022-01-31 ENCOUNTER — TELEPHONE (OUTPATIENT)
Dept: FAMILY MEDICINE CLINIC | Age: 77
End: 2022-01-31
